# Patient Record
Sex: FEMALE | Race: BLACK OR AFRICAN AMERICAN | NOT HISPANIC OR LATINO | Employment: PART TIME | URBAN - METROPOLITAN AREA
[De-identification: names, ages, dates, MRNs, and addresses within clinical notes are randomized per-mention and may not be internally consistent; named-entity substitution may affect disease eponyms.]

---

## 2023-06-21 PROBLEM — E61.1 IRON DEFICIENCY: Status: ACTIVE | Noted: 2022-04-22

## 2023-06-21 PROBLEM — D50.9 IRON DEFICIENCY ANEMIA: Status: ACTIVE | Noted: 2022-04-22

## 2023-06-21 PROBLEM — O03.4 RETAINED PRODUCTS OF CONCEPTION AFTER MISCARRIAGE: Status: ACTIVE | Noted: 2023-02-12

## 2023-06-21 PROBLEM — M54.16 LUMBAR RADICULOPATHY: Status: ACTIVE | Noted: 2021-12-02

## 2023-08-11 ENCOUNTER — OFFICE VISIT (OUTPATIENT)
Dept: FAMILY MEDICINE CLINIC | Facility: CLINIC | Age: 33
End: 2023-08-11
Payer: COMMERCIAL

## 2023-08-11 VITALS
HEIGHT: 62 IN | WEIGHT: 178 LBS | BODY MASS INDEX: 32.76 KG/M2 | HEART RATE: 82 BPM | TEMPERATURE: 97 F | OXYGEN SATURATION: 97 % | DIASTOLIC BLOOD PRESSURE: 80 MMHG | SYSTOLIC BLOOD PRESSURE: 120 MMHG

## 2023-08-11 DIAGNOSIS — N92.0 MENORRHAGIA WITH REGULAR CYCLE: ICD-10-CM

## 2023-08-11 DIAGNOSIS — F43.21 GRIEF ASSOCIATED WITH LOSS OF FETUS: Primary | ICD-10-CM

## 2023-08-11 PROCEDURE — 99213 OFFICE O/P EST LOW 20 MIN: CPT | Performed by: PHYSICIAN ASSISTANT

## 2023-08-11 PROCEDURE — 1160F RVW MEDS BY RX/DR IN RCRD: CPT | Performed by: PHYSICIAN ASSISTANT

## 2023-08-11 PROCEDURE — 1159F MED LIST DOCD IN RCRD: CPT | Performed by: PHYSICIAN ASSISTANT

## 2023-08-11 RX ORDER — NAPROXEN 500 MG/1
500 TABLET ORAL 2 TIMES DAILY WITH MEALS
Qty: 30 TABLET | Refills: 1 | Status: SHIPPED | OUTPATIENT
Start: 2023-08-11

## 2023-08-11 NOTE — PROGRESS NOTES
"     Follow Up Office Visit      Date: 2023   Patient Name: Trace Aranda  : 1990   MRN: [id number]     Chief Complaint:  No chief complaint on file.      History of Present Illness: Trace Aranda is a 33 y.o. female who is here today to follow up with needing some forms filled out for work.  She had a miscarriage recently and has not been back to work.  She still needs to see gynecology.  She is having a lot of heavy bleeding with the most recent menses she had.  She denies any dizziness, no abdominal pain.  Would like some therapy for her grief.      Subjective      Review of systems:  Review of Systems     I have reviewed and the following portions of the patient's history were updated as appropriate: past family history, past medical history, past social history, past surgical history and problem list.    Medications:     Current Outpatient Medications:     acetaminophen (TYLENOL) 500 MG tablet, Take 2 tablets by mouth 4 (Four) Times a Day., Disp: , Rfl:     ibuprofen (ADVIL,MOTRIN) 800 MG tablet, Take 1 tablet by mouth Every 6 (Six) Hours As Needed for Mild Pain., Disp: , Rfl:     olopatadine (PATADAY) 0.2 % solution ophthalmic solution, Administer 1 drop to the right eye Daily., Disp: 2.5 mL, Rfl: 2    naproxen (Naprosyn) 500 MG tablet, Take 1 tablet by mouth 2 (Two) Times a Day With Meals. For pain, Disp: 30 tablet, Rfl: 1    Allergies:   No Known Allergies    Objective     Vital Signs:   Vitals:    23 1215   BP: 120/80   Pulse: 82   Temp: 97 øF (36.1 øC)   SpO2: 97%   Weight: 80.7 kg (178 lb)   Height: 157.5 cm (62.01\")   PainSc: 0-No pain     Body mass index is 32.55 kg/mý.          Physical Exam:   Physical Exam  Vitals and nursing note reviewed.   Constitutional:       Appearance: Normal appearance.   HENT:      Head: Normocephalic and atraumatic.   Cardiovascular:      Rate and Rhythm: Normal rate and regular rhythm.   Pulmonary:      Effort: Pulmonary effort is normal.      Breath sounds: " Normal breath sounds.   Musculoskeletal:      Cervical back: Neck supple.      Right lower leg: No edema.      Left lower leg: No edema.   Neurological:      Mental Status: She is alert.   Psychiatric:         Attention and Perception: Attention normal.         Mood and Affect: Mood is depressed.         Speech: Speech normal.         Behavior: Behavior normal.        Procedures     Assessment / Plan      Assessment/Plan:   Diagnoses and all orders for this visit:    1. Grief associated with loss of fetus (Primary)  -     Ambulatory Referral to Behavioral Health    2. Menorrhagia with regular cycle  -     Ambulatory Referral to Gynecology    Other orders  -     naproxen (Naprosyn) 500 MG tablet; Take 1 tablet by mouth 2 (Two) Times a Day With Meals. For pain  Dispense: 30 tablet; Refill: 1       We will arrange therapy for her.  Refer to gynecology.  We will also fill out her form she has today for work.  She will let me know of any issues.  Follow Up:   No follow-ups on file.    Judi Helm PA-C   [facility]

## 2023-09-14 ENCOUNTER — TELEPHONE (OUTPATIENT)
Dept: PSYCHIATRY | Facility: CLINIC | Age: 33
End: 2023-09-14
Payer: COMMERCIAL

## 2023-09-14 NOTE — TELEPHONE ENCOUNTER
Left patient a detailed vm in regards to the appointment being reschedule with [facility]

## 2023-09-20 ENCOUNTER — TELEMEDICINE (OUTPATIENT)
Dept: PSYCHIATRY | Facility: CLINIC | Age: 33
End: 2023-09-20
Payer: COMMERCIAL

## 2023-09-20 DIAGNOSIS — N92.0 MENORRHAGIA WITH REGULAR CYCLE: Primary | ICD-10-CM

## 2023-09-20 NOTE — PROGRESS NOTES
To: Judi Helm PA-C,    [facility], I met with Trace Aranda today for an Initial Evaluation regarding her mental health needs. During this first session, she expressed that she has been told by her health providers that she would be seeing a gynecologist for the problems related to heavy bleeding (including clots of blood) during her menstrual periods. These have occurred since her miscarriage on May 28, 2023.    Please help Trace, as soon as possible, to secure an appointment with a gynecologist. Trace suffers from serious thoughts of not caring whether she lives, however, she does not have a plan or say she wants to commit suicide, at this time.    I cannot emphasize enough the importance of setting a gynecologist appointment for Trace and calling her ASAP, best number is [phone], hopefully by tomorrow (9/21/2023) to confirm what date and time an appointment has been made for her.    Thank you for your urgent attention to this concern for Trace Aranda, your patient.     Sincerely,    Abena Vanessa, [facility]  [phone]
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
## 2023-10-04 ENCOUNTER — TELEMEDICINE (OUTPATIENT)
Dept: PSYCHIATRY | Facility: CLINIC | Age: 33
End: 2023-10-04
Payer: COMMERCIAL

## 2023-10-04 NOTE — PROGRESS NOTES
Date: October 10, 2023  Time In: 4:08 pm   Time Out: 4:38 pm     This provider is [facility] [address], [place] [address] using a secure Ex[employer]hart Video Visit through [employer]. Patient is being seen remotely via telehealth at home address in [place] and stated they are in a secure environment for this session. The patient's condition being diagnosed/treated is appropriate for telemedicine. The provider identified herself as well as her credentials. The patient, and/or patients guardian, consent to be seen remotely, and when consent is given they understand that the consent allows for patient identifiable information to be sent to a third party as needed. They may refuse to be seen remotely at any time. The electronic data is encrypted and password protected, and the patient and/or guardian has been advised of the potential risks to privacy not withstanding such measures.     You have chosen to receive care through a telehealth visit.  Do you consent to use a video/audio connection for your medical care today? Yes    PROGRESS NOTE  Data:  Trace Aranda is a 33 y.o. female who presents today for follow up    Chief Complaint: Pt. Said she feels great (good that we get to talk again.)    History of Present Illness: Depression, she needs to see a gynecologist because of the heavy bleeding that has now stopped.       Clinical Maneuvering/Intervention:    (Scales based on 0 - 10 with 10 being the worst)  Depression: 7 Anxiety: 8 or 9        Assisted patient in processing above session content; acknowledged and normalized patient's thoughts, feelings, and concerns.  Rationalized patient thought process regarding anxiety and depression.  Discussed triggers associated with patient's anxiety including her supervisor not having understanding of medical and mental health appointments.  Also discussed coping skills for patient to implement such as  taking care of herself and having activities that will make her feel better.    Allowed patient to freely discuss issues without interruption or judgment. Provided safe, confidential environment to facilitate the development of positive therapeutic relationship and encourage open, honest communication. Assisted patient in identifying risk factors which would indicate the need for higher level of care including thoughts to harm self or others and/or self-harming behavior and encouraged patient to contact this office, call 911, or present to the nearest emergency room should any of these events occur. Discussed crisis intervention services and means to access. Patient adamantly and convincingly denies current suicidal or homicidal ideation or perceptual disturbance.    Assessment:   Assessment   Patient appears to maintain relative stability as compared to their baseline.  However, patient continues to struggle with anxieety which continues to cause impairment in important areas of functioning.  A result, they can be reasonably expected to continue to benefit from treatment and would likely be at increased risk for decompensation otherwise. Pt reports that her friends help by making her laugh and keeping her occupied by talking and sharing information.     Mental Status Exam:   Hygiene:   good  Cooperation:  Cooperative  Eye Contact:  Good  Psychomotor Behavior:  Appropriate  Affect:  Appropriate  Mood: depressed and anxious  Speech:  Normal  Thought Process:  Goal directed  Thought Content:  Normal  Suicidal:  None  Homicidal:  None  Hallucinations:  None  Delusion:  None  Memory:  Intact  Orientation:  Person, Place, Time, and Situation  Reliability:  good  Insight:  Good  Judgement:  Good  Impulse Control:  Good  Physical/Medical Issues:  Yes Pain level is 10 out of 1 to 10 - she is suffering even while we are meeting for the session today. She has trouble sleeping on her back - lower pack  pain - she exhibits that  this hard to bare, having difficulty sitting or holding her head up due to the pain.         Patient's Support Network Includes:  mother  daily    Functional Status: Severe impairment because of the back pain     Progress toward goal: Not at goal    Prognosis: Guarded with Ongoing Treatment         Plan:    Patient will continue in individual outpatient therapy with focus on improved functioning and coping skills, maintaining stability, and avoiding decompensation and the need for higher level of care.    Patient will adhere to medication regimen as prescribed and report any side effects. Patient will contact this office, call 911 or present to the nearest emergency room should suicidal or homicidal ideations occur. Provide Cognitive Behavioral Therapy and Solution Focused Therapy to improve functioning, maintain stability, and avoid decompensation and the need for higher level of care.     Return in about 2 weeks, or earlier if symptoms worsen or fail to improve.           VISIT DIAGNOSIS:   No diagnosis found.       This document has been electronically signed by Abena Vanessa [facility]  October 10, 2023 16:48 EDT     Part of this note may be an electronic transcription/translation of spoken language to printed text using the Dragon Dictation System.

## 2023-11-06 ENCOUNTER — TELEPHONE (OUTPATIENT)
Dept: FAMILY MEDICINE CLINIC | Facility: CLINIC | Age: 33
End: 2023-11-06

## 2023-11-06 NOTE — TELEPHONE ENCOUNTER
Caller: Trace Aranda    Relationship to patient: Self    Best call back number: [phone]    New or established patient?  [] New  [x] Established    Date of discharge: 11/03/2023    Facility discharged from: [facility]    Diagnosis/Symptoms: HEADACHE

## 2023-11-07 ENCOUNTER — OFFICE VISIT (OUTPATIENT)
Dept: FAMILY MEDICINE CLINIC | Facility: CLINIC | Age: 33
End: 2023-11-07
Payer: COMMERCIAL

## 2023-11-07 VITALS
WEIGHT: 175 LBS | HEIGHT: 62 IN | SYSTOLIC BLOOD PRESSURE: 100 MMHG | OXYGEN SATURATION: 100 % | HEART RATE: 84 BPM | TEMPERATURE: 98.6 F | DIASTOLIC BLOOD PRESSURE: 64 MMHG | BODY MASS INDEX: 32.2 KG/M2

## 2023-11-07 DIAGNOSIS — R51.9 SUDDEN ONSET OF SEVERE HEADACHE: Primary | ICD-10-CM

## 2023-11-07 DIAGNOSIS — R93.0 ABNORMAL CT SCAN OF HEAD: ICD-10-CM

## 2023-11-07 RX ORDER — PREDNISONE 50 MG/1
50 TABLET ORAL DAILY
COMMUNITY
Start: 2023-11-03 | End: 2023-11-07

## 2023-11-07 RX ORDER — KETOROLAC TROMETHAMINE 10 MG/1
TABLET, FILM COATED ORAL
COMMUNITY
Start: 2023-10-22 | End: 2023-11-10

## 2023-11-07 RX ORDER — ONDANSETRON 8 MG/1
8 TABLET, ORALLY DISINTEGRATING ORAL
COMMUNITY
Start: 2023-10-18

## 2023-11-07 NOTE — PROGRESS NOTES
Follow Up Office Visit      Date: 2023   Patient Name: Trace Aranda  : 1990   MRN: [id number]     Chief Complaint:    Chief Complaint   Patient presents with    Follow-up     ED Follow up for Headaches they found something on the CT scan and she was told to follow up with you.       History of Present Illness: Trace Aranda is a 33 y.o. female who is here today to follow up with headaches.      Trace Aranda date of birth 1990. Presents in clinic for a emergency department follow-up.     The patient reports that she has had headaches for 1 month. She reports that she is unable to work she reprots that she has been taking Tylenol for reliefe. She reports that her headaches get so severe she does not want to talk. She reports that she had a CT scan of her head with contrast and lumbar puncture on her spinal cord. She reports that her CT scan and Lumbar puncture resulted normal. She reports that she was prescribed mediation and sent home. The patient reports that the more that she goes to the emergency room she becomes sick. She reprots that she was discharged from the emergency department on 11/3/2023. She reports that she was referred to her primary care physcician and diagnosed with a migraine. She reports that she has never had headaches like this previously. The patient reports that her head has been hurting all over. She reports that after her lumbar puncture she begun to have pain in her spinal cord that travels to her head. She reports that she experiences pain anytime she tries to move. The patient reports that she has had a MRI before when she broke her leg. She reports that she might have something wrong in her pituitary gland. The patient reports that her vision is well. She reports that while at the emergency department she was not alerted about her blood pressure. She reports that she has fevers sometimes. The patient denies any issues walking. She reports that she has dizziness  "occasionally.    Subjective      Review of systems:  Review of Systems   Constitutional:  Negative for fatigue and fever.   HENT:  Negative for trouble swallowing.    Eyes:  Negative for visual disturbance.   Respiratory:  Negative for cough and shortness of breath.    Cardiovascular:  Negative for chest pain and leg swelling.   Gastrointestinal:  Negative for abdominal pain.        I have reviewed and the following portions of the patient's history were updated as appropriate: past family history, past medical history, past social history, past surgical history and problem list.    Medications:     Current Outpatient Medications:     acetaminophen (TYLENOL) 500 MG tablet, Take 2 tablets by mouth 4 (Four) Times a Day., Disp: , Rfl:     naproxen (Naprosyn) 500 MG tablet, Take 1 tablet by mouth 2 (Two) Times a Day With Meals. For pain, Disp: 30 tablet, Rfl: 1    ondansetron ODT (ZOFRAN-ODT) 8 MG disintegrating tablet, Take 1 tablet by mouth., Disp: , Rfl:     Allergies:   No Known Allergies    Objective     Vital Signs:   Vitals:    11/07/23 1055   BP: 100/64   Pulse: 84   Temp: 98.6 °F (37 °C)   SpO2: 100%   Weight: 79.4 kg (175 lb)   Height: 157.5 cm (62\")     Body mass index is 32.01 kg/m².          Physical Exam:   Physical Exam  Vitals and nursing note reviewed.   Constitutional:       Appearance: Normal appearance.   HENT:      Head: Normocephalic and atraumatic.      Right Ear: Tympanic membrane and ear canal normal.      Left Ear: Tympanic membrane and ear canal normal.      Nose: No congestion or rhinorrhea.      Mouth/Throat:      Mouth: Mucous membranes are moist.      Pharynx: Oropharynx is clear. No posterior oropharyngeal erythema.   Eyes:      Extraocular Movements: Extraocular movements intact.      Pupils: Pupils are equal, round, and reactive to light.   Cardiovascular:      Rate and Rhythm: Normal rate and regular rhythm.   Pulmonary:      Effort: Pulmonary effort is normal.      Breath sounds: " Normal breath sounds. No decreased breath sounds, wheezing, rhonchi or rales.   Musculoskeletal:      Cervical back: Neck supple.      Right lower leg: No edema.      Left lower leg: No edema.   Lymphadenopathy:      Cervical: No cervical adenopathy.   Neurological:      General: No focal deficit present.      Mental Status: She is alert.      Cranial Nerves: Cranial nerves 2-12 are intact.      Motor: Motor function is intact.      Gait: Gait is intact.          Procedures     Assessment / Plan      Assessment/Plan:   Diagnoses and all orders for this visit:    1. Sudden onset of severe headache (Primary)  -     MRI Brain With & Without Contrast; Future    2. Abnormal CT scan of head  -     MRI Brain With & Without Contrast; Future       Plan, abdominal CT with a sudden onset severe head ache  We will obtain a MRI ASAP to further assess.     Follow Up:   No follow-ups on file.    Judi Helm PA-C   [facility] [place] [place] [facility] [place]  Transcribed from ambient dictation for Judi Helm PA-C by Gini Romero.  11/07/23   12:27 EST    Patient or patient representative verbalized consent to the visit recording.  I have personally performed the services described in this document as transcribed by the above individual, and it is both accurate and complete.

## 2023-11-08 ENCOUNTER — TELEPHONE (OUTPATIENT)
Dept: FAMILY MEDICINE CLINIC | Facility: CLINIC | Age: 33
End: 2023-11-08
Payer: COMMERCIAL

## 2023-11-08 ENCOUNTER — TELEMEDICINE (OUTPATIENT)
Dept: PSYCHIATRY | Facility: CLINIC | Age: 33
End: 2023-11-08
Payer: COMMERCIAL

## 2023-11-08 DIAGNOSIS — F33.2 SEVERE EPISODE OF RECURRENT MAJOR DEPRESSIVE DISORDER, WITHOUT PSYCHOTIC FEATURES: ICD-10-CM

## 2023-11-08 DIAGNOSIS — F41.1 GENERALIZED ANXIETY DISORDER: ICD-10-CM

## 2023-11-08 NOTE — PROGRESS NOTES
Date: November 8, 2023  Time In: 8:00 am EST   Time Out: 8:50 am EST    This provider is located at the [facility]through [facility]), [address], [place] [address] using a secure [employer] Video Visit through [employer]. Patient is being seen remotely via telehealth at home address in [place] and stated they are in a secure environment for this session. The patient's condition being diagnosed/treated is appropriate for telemedicine. The provider identified herself as well as her credentials. The patient, and/or patients guardian, consent to be seen remotely, and when consent is given they understand that the consent allows for patient identifiable information to be sent to a third party as needed. They may refuse to be seen remotely at any time. The electronic data is encrypted and password protected, and the patient and/or guardian has been advised of the potential risks to privacy not withstanding such measures.     You have chosen to receive care through a telehealth visit.  Do you consent to use a video/audio connection for your medical care today? Yes    Subjective   Trace Aranda is a 33 y.o. female who presents today for follow up    Chief Complaint:   Chief Complaint   Patient presents with    Sleeping Problem    Anxiety    Depression    Headache    Medication Problem    Pain    Med Management           Clinical Maneuvering/Intervention:      Assisted patient in processing above session content; acknowledged and normalized patient’s thoughts, feelings, and concerns.  Rationalized patient thought process regarding concerns presented at session.  Discussed triggers associated with patient's  anxiety , depression and extreme pain causing inability to sleep and rest after having a lumbar puncture. She reports that it hurts to talk - she feels this in her back and causes additional pain. Also discussed coping skills for patient to implement such as  Pt stating there is  little comfort and relief for her and she is in constant pain, Pt states she has been to ER five times over the past few weeks and not able to receive relief for any length of time -  Told her that steroids could help at hospital while she does not have pain medications to relieve her extreme/unbearable back pain and headaches.       Allowed patient to freely discuss issues without interruption or judgment. Provided safe, confidential environment to facilitate the development of positive therapeutic relationship and encourage open, honest communication. Assisted patient in identifying risk factors which would indicate the need for higher level of care including thoughts to harm self or others and/or self-harming behavior and encouraged patient to contact this office, call 911, or present to the nearest emergency room should any of these events occur. Discussed crisis intervention services and means to access. Patient adamantly and convincingly denies current suicidal or homicidal ideation or perceptual disturbance.    Assessment:     Patient appears to maintain relative stability as compared to their baseline.  However, patient continues to struggle with extreme pain that she describes as unbearable and causing headaches (anxiety and depression) which continues to cause impairment in important areas of functioning.  A result, they can be reasonably expected to continue to benefit from treatment and would likely be at increased risk for decompensation otherwise.      PHQ-Score Total:  PHQ-9 Total Score: 24    SELENE-7 Score Total:  Over the last two weeks, how often have you been bothered by the following problems?  Feeling nervous, anxious or on edge: Nearly every day  Not being able to stop or control worrying: Nearly every day  Worrying too much about different things: Nearly every day  Trouble Relaxing: Nearly every day  Being so restless that it is hard to sit still: Nearly every day  Becoming easily annoyed or  irritable: Nearly every day  Feeling afraid as if something awful might happen: Nearly every day  SELENE 7 Total Score: 21  If you checked any problems, how difficult have these problems made it for you to do your work, take care of things at home, or get along with other people: Extremely difficult      Mental Status Exam:   Hygiene:   poor  Cooperation:  Cooperative  Eye Contact:  Poor  Psychomotor Behavior:  Slow  Affect:   sad due to severe pain  Mood: depressed  Speech:  Monotone  Thought Process:  Linear  Thought Content:   struggles to answer questions/thinking appears to cause more pain  Suicidal:  None  Homicidal:  None  Hallucinations:  None  Delusion:  None  Memory:  Intact  Orientation:  Person, Place, Time, and Situation  Reliability:  good  Insight:  Good  Judgement:  Good  Impulse Control:  Good  Physical/Medical Issues:  Yes Severe pain including Pt reports that the back pain after the lumbar puncture increased and is unbearable/she is unable to sleep. Pt has severe headaches and back pain constantly. She reports having been to the ER five times over the past few weeks.         Patient's Support Network Includes:  extended family - her cousin transported her to the Dr. Will Yesterday. Pt cannot drive due to the severe pain.    Functional Status: Severe impairment    Progress toward goal: Not at goal    Prognosis:  Pt needs help from her medical team to manage her extreme pain.       Plan:    Patient will continue in individual outpatient therapy with focus on improved functioning and coping skills, maintaining stability, and avoiding decompensation and the need for higher level of care.    Patient will adhere to medication regimen as prescribed and report any side effects. Patient will contact this office, call 911 or present to the nearest emergency room should suicidal or homicidal ideations occur. Provide Cognitive Behavioral Therapy and Solution Focused Therapy to improve functioning, maintain  stability, and avoid decompensation and the need for higher level of care.         VISIT DIAGNOSIS:     ICD-10-CM ICD-9-CM   1. Generalized anxiety disorder  F41.1 300.02   2. Severe episode of recurrent major depressive disorder, without psychotic features  F33.2 296.33        Return in about 1 week (around 11/15/2023).    This document has been electronically signed by Abena Vanessa [facility]  November 8, 2023 09:02 EST     Part of this note may be an electronic transcription/translation of spoken language to printed text using the Dragon Dictation System.

## 2023-11-08 NOTE — LETTER
November 8, 2023     Judi Helm PA-C  [facility] [address]    Patient: Trace Aranda   YOB: 1990   Date of Visit: 11/8/2023     Dear Judi Helm PA-C:       Thank you for referring Trace Aranda to me for evaluation. Below are the relevant portions of my assessment and plan of care.    I met with Trace today over the Telehealth services for her mental health therapy. She expressed the severity of her pain in her head and back, stating that after the lumbar puncture - this accelerated. She shared that she met with you yesterday. Her pain was evident and she was hurting so much that talking even added to her pain levels.     Trace expressed concerns and needs for financial assistance with her medical bills. Please help by referring Trace to the proper financial counselors at [facility] to assist with payment of her doctor bills, medical tests, etc.  This assistance should help relieve one of the additional stressors felt by this patient. She stated that she has been unable to work for 4 weeks and this is causing concerns of how she will pay her bills.  Anything you are able to do, including referring her to the proper official with [facility] will be appreciated.     If you have questions, please do not hesitate to call me. I look forward to following Trace along with you.         Sincerely,      Abena Vanessa, [facility]    CC: No Recipients

## 2023-11-09 ENCOUNTER — TELEPHONE (OUTPATIENT)
Dept: FAMILY MEDICINE CLINIC | Facility: CLINIC | Age: 33
End: 2023-11-09
Payer: COMMERCIAL

## 2023-11-09 NOTE — TELEPHONE ENCOUNTER
PATIENT SAID EMPLOYER IS SENDING PAPERWORK.  PLEASE CONTACT PATIENT WHEN FILLING OUT. I BELIEVE THIS IS [facility] PAPERWORK AND SHE SAID IT IS DUE TOMORROW.  I DID INFORM PATIENT THERE IS A $20 FORM FEE THAT WILL NEED TO BE PAID BEFORE THE PAPERWORK CAN BE RELEASED.      PHONE: [phone]

## 2023-11-10 ENCOUNTER — OFFICE VISIT (OUTPATIENT)
Dept: FAMILY MEDICINE CLINIC | Facility: CLINIC | Age: 33
End: 2023-11-10
Payer: COMMERCIAL

## 2023-11-10 VITALS
OXYGEN SATURATION: 98 % | HEART RATE: 72 BPM | TEMPERATURE: 97.6 F | HEIGHT: 62 IN | BODY MASS INDEX: 32.46 KG/M2 | DIASTOLIC BLOOD PRESSURE: 94 MMHG | SYSTOLIC BLOOD PRESSURE: 126 MMHG | WEIGHT: 176.4 LBS

## 2023-11-10 DIAGNOSIS — Z23 NEED FOR IMMUNIZATION AGAINST INFLUENZA: ICD-10-CM

## 2023-11-10 DIAGNOSIS — G43.511 INTRACTABLE PERSISTENT MIGRAINE AURA WITHOUT CEREBRAL INFARCTION AND WITH STATUS MIGRAINOSUS: Primary | ICD-10-CM

## 2023-11-10 NOTE — TELEPHONE ENCOUNTER
PATIENT CALLED ANS STATED THEIR IS PAGE OF THE PAPER WORK THAT NEEDS TO BE CHANGED. PART C AND C1. PART C NEED TO BE CHANGE TO UNINTERRUPTED BLOCK OF TIME OFF.   C1 NEEDS TO ADD BEGINNING DATES AND RETURN TO WORK DATES. BEGINNING DATE IS 10/16/2023-11/10/2023  C2 CAN BE LEFT BLANK.  PLEASE CALL IF YOU HAVE QUESTIONS

## 2023-11-10 NOTE — PROGRESS NOTES
"     Follow Up Office Visit      Date: 11/10/2023   Patient Name: Trace Aranda  : 1990   MRN: [id number]     Chief Complaint:    Chief Complaint   Patient presents with    paperwork       History of Present Illness: Trace Aranda is a 33 y.o. female who is here today to follow up with [place] paperwork. She is currently undergoing workup for new onset severe headaches. She is having trouble working with these, and also getting to appts.  Flu vaccine today.      Subjective      Review of systems:  Review of Systems     I have reviewed and the following portions of the patient's history were updated as appropriate: past family history, past medical history, past social history, past surgical history and problem list.    Medications:     Current Outpatient Medications:     acetaminophen (TYLENOL) 500 MG tablet, Take 2 tablets by mouth 4 (Four) Times a Day., Disp: , Rfl:     naproxen (Naprosyn) 500 MG tablet, Take 1 tablet by mouth 2 (Two) Times a Day With Meals. For pain, Disp: 30 tablet, Rfl: 1    ondansetron ODT (ZOFRAN-ODT) 8 MG disintegrating tablet, Take 1 tablet by mouth., Disp: , Rfl:     Allergies:   No Known Allergies    Objective     Vital Signs:   Vitals:    11/10/23 1038   BP: 126/94   Pulse: 72   Temp: 97.6 °F (36.4 °C)   TempSrc: Infrared   SpO2: 98%   Weight: 80 kg (176 lb 6.4 oz)   Height: 157.5 cm (62.01\")   PainSc:   5     Body mass index is 32.26 kg/m².          Physical Exam:   Physical Exam   I fill out her [facility] papers today. No exam repeated.    Procedures     Assessment / Plan      Assessment/Plan:   Diagnoses and all orders for this visit:    1. Intractable persistent migraine aura without cerebral infarction and with status migrainosus (Primary)    2. Need for immunization against influenza  -     FluLaval/Fluzone >6 mos  ([id number])         Follow Up:   No follow-ups on file.    Judi Helm PA-C   [facility] [place] [facility] [place]  "

## 2023-11-13 ENCOUNTER — TELEPHONE (OUTPATIENT)
Dept: FAMILY MEDICINE CLINIC | Facility: CLINIC | Age: 33
End: 2023-11-13

## 2023-11-13 NOTE — TELEPHONE ENCOUNTER
Caller: Trace Aranda    Relationship: Self    Best call back number: [phone]    What form or medical record are you requesting: [facility] PAPERWORK    Who is requesting this form or medical record from you: WORK    How would you like to receive the form or medical records (pick-up, mail, fax): FAX TO [employer]      Timeframe paperwork needed: ASAP    Additional notes: PATIENT STATES HER WORK HAS NOT RECEIVED ALL PAPERWORK ON HER [facility]. PLEASE RESEND ALL PAPERS.

## 2023-11-13 NOTE — TELEPHONE ENCOUNTER
Yes it was faxed at the front - I don't see where it has been scanned into chart yet. I need to make a change on it so I do need those back.

## 2023-11-14 ENCOUNTER — TELEMEDICINE (OUTPATIENT)
Dept: PSYCHIATRY | Facility: CLINIC | Age: 33
End: 2023-11-14
Payer: COMMERCIAL

## 2023-11-14 DIAGNOSIS — F33.1 MAJOR DEPRESSIVE DISORDER, RECURRENT EPISODE, MODERATE: Primary | ICD-10-CM

## 2023-11-14 PROCEDURE — 90832 PSYTX W PT 30 MINUTES: CPT | Performed by: COUNSELOR

## 2023-11-14 NOTE — PROGRESS NOTES
Date: November 14, 2023  Time In: 4:00 pm EST  Time Out: 4:16 pm EST    This provider is located at the [facility] ([facility]), [address], [place] [address] using a secure [employer] Video Visit through [employer]. Patient is being seen remotely via telehealth at home address in [place] and stated they are in a secure environment for this session. The patient's condition being diagnosed/treated is appropriate for telemedicine. The provider identified herself as well as her credentials. The patient, and/or patients guardian, consent to be seen remotely, and when consent is given they understand that the consent allows for patient identifiable information to be sent to a third party as needed. They may refuse to be seen remotely at any time. The electronic data is encrypted and password protected, and the patient and/or guardian has been advised of the potential risks to privacy not withstanding such measures.     You have chosen to receive care through a telehealth visit.  Do you consent to use a video/audio connection for your medical care today? Yes    Subjective   Trace Aranda is a 33 y.o. female who presents today for follow up    Chief Complaint:   Chief Complaint   Patient presents with    Depression           Clinical Maneuvering/Intervention:      Assisted patient in processing above session content; acknowledged and normalized patient’s thoughts, feelings, and concerns.  Rationalized patient thought process regarding concerns presented at session.  Discussed triggers associated with patient's  depression  Also discussed coping skills for patient to implement such as increasing activity , self care , positive self talk , and [employer] visiting.      Allowed patient to freely discuss issues without interruption or judgment. Provided safe, confidential environment to facilitate the development of positive therapeutic relationship and encourage open,  honest communication. Assisted patient in identifying risk factors which would indicate the need for higher level of care including thoughts to harm self or others and/or self-harming behavior and encouraged patient to contact this office, call 911, or present to the nearest emergency room should any of these events occur. Discussed crisis intervention services and means to access. Patient adamantly and convincingly denies current suicidal or homicidal ideation or perceptual disturbance.    Assessment:     Patient appears to maintain relative stability as compared to their baseline.  However, patient continues to struggle with depression which continues to cause impairment in important areas of functioning.  A result, they can be reasonably expected to continue to benefit from treatment and would likely be at increased risk for decompensation otherwise.      PHQ-Score Total:  PHQ-9 Total Score: 16    SELENE-7 Score Total:         Mental Status Exam:   Hygiene:   good  Cooperation:  Cooperative  Eye Contact:  Good  Psychomotor Behavior:  Appropriate  Affect:  Appropriate  Mood: normal  Speech:  Normal  Thought Process:  Goal directed  Thought Content:  Normal  Suicidal:  None  Homicidal:  None  Hallucinations:  None  Delusion:  None  Memory:  Intact  Orientation:  Person, Place, Time, and Situation  Reliability:  good  Insight:  Good  Judgement:  Good  Impulse Control:  Good  Physical/Medical Issues:  Yes headaches, back pain         Patient's Support Network Includes:  extended family and [employer] members    Functional Status: Moderate impairment     Progress toward goal: Not at goal    Prognosis: Guarded with Ongoing Treatment      Plan:    Patient will continue in individual outpatient therapy with focus on improved functioning and coping skills, maintaining stability, and avoiding decompensation and the need for higher level of care.    Patient will adhere to medication regimen as prescribed and report any side  effects. Patient will contact this office, call 911 or present to the nearest emergency room should suicidal or homicidal ideations occur. Provide Cognitive Behavioral Therapy and Solution Focused Therapy to improve functioning, maintain stability, and avoid decompensation and the need for higher level of care.         VISIT DIAGNOSIS:     ICD-10-CM ICD-9-CM   1. Major depressive disorder, recurrent episode, moderate  F33.1 296.32        No follow-ups on file.    This document has been electronically signed by Abena Vanessa [facility] Hospital[facility]SE ANGEL  November 14, 2023 4:00 pm EST     Part of this note may be an electronic transcription/translation of spoken language to printed text using the Dragon Dictation System.

## 2023-11-20 NOTE — TELEPHONE ENCOUNTER
I have filled in the requested information for the block of time. I would recommend keeping the intermittent leave as well in case she needs to miss work due to appts, tests, or migraines, as she can use FMLA for those things associated with her migraines.

## 2023-11-21 ENCOUNTER — TELEPHONE (OUTPATIENT)
Dept: FAMILY MEDICINE CLINIC | Facility: CLINIC | Age: 33
End: 2023-11-21

## 2023-11-21 ENCOUNTER — HOSPITAL ENCOUNTER (OUTPATIENT)
Dept: MRI IMAGING | Facility: HOSPITAL | Age: 33
Discharge: HOME | End: 2023-11-21
Admitting: PHYSICIAN ASSISTANT
Payer: COMMERCIAL

## 2023-11-21 DIAGNOSIS — R51.9 SUDDEN ONSET OF SEVERE HEADACHE: ICD-10-CM

## 2023-11-21 DIAGNOSIS — R93.0 ABNORMAL CT SCAN OF HEAD: ICD-10-CM

## 2023-11-21 PROCEDURE — A9577 INJ MULTIHANCE: HCPCS | Performed by: PHYSICIAN ASSISTANT

## 2023-11-21 PROCEDURE — 70553 MRI BRAIN STEM W/O & W/DYE: CPT

## 2023-11-21 PROCEDURE — 0 GADOBENATE DIMEGLUMINE 529 MG/ML SOLUTION: Performed by: PHYSICIAN ASSISTANT

## 2023-11-21 RX ADMIN — GADOBENATE DIMEGLUMINE 15 ML: 529 INJECTION, SOLUTION INTRAVENOUS at 15:54

## 2023-11-27 ENCOUNTER — TELEMEDICINE (OUTPATIENT)
Dept: PSYCHIATRY | Facility: CLINIC | Age: 33
End: 2023-11-27
Payer: COMMERCIAL

## 2023-11-27 DIAGNOSIS — F41.1 GENERALIZED ANXIETY DISORDER: ICD-10-CM

## 2023-11-27 DIAGNOSIS — F33.2 SEVERE EPISODE OF RECURRENT MAJOR DEPRESSIVE DISORDER, WITHOUT PSYCHOTIC FEATURES: ICD-10-CM

## 2023-11-27 NOTE — LETTER
November 27, 2023     Judi Helm PA-C  [address] [place] [id number]    Patient: Trace Aranda   YOB: 1990   Date of Visit: 11/27/2023     Dear Judi Helm PA-C:       Thank you for referring Trace Aranda to me for evaluation. Below are the relevant portions of my assessment and plan of care.    Today Trace met for therapy and mentioned that she doesn't understand the MyChart results about the recent MRI on 11/21/23. Please contact her to review this information with you because she doesn't understand what is documented on MyChart. We appreciate this very much.    If you have questions, please do not hesitate to call me. I look forward to following Trace along with you.         Sincerely,      Abena Vanessa, [facility]        CC: Trace Aranda

## 2023-11-27 NOTE — PROGRESS NOTES
Date: November 30, 2023  Time In: 16:00 EST  Time Out: 16:28 EST    This provider is located at the [facility] (through [facility]), [address], [place] [address] using a secure [employer] Video Visit through [employer]. Patient is being seen remotely via telehealth at home address in [place] and stated they are in a secure environment for this session. The patient's condition being diagnosed/treated is appropriate for telemedicine. The provider identified herself as well as her credentials. The patient, and/or patients guardian, consent to be seen remotely, and when consent is given they understand that the consent allows for patient identifiable information to be sent to a third party as needed. They may refuse to be seen remotely at any time. The electronic data is encrypted and password protected, and the patient and/or guardian has been advised of the potential risks to privacy not withstanding such measures.     You have chosen to receive care through a telehealth visit.  Do you consent to use a video/audio connection for your medical care today? Yes    Subjective   Trace Aranda is a 33 y.o. female who presents today for follow up    Chief Complaint:   Chief Complaint   Patient presents with    Anxiety    Depression           Clinical Maneuvering/Intervention:      Assisted patient in processing above session content; acknowledged and normalized patient’s thoughts, feelings, and concerns.  Rationalized patient thought process regarding concerns presented at session.  Discussed triggers associated with patient's  anxiety  and depression  Also discussed coping skills for patient to implement such as increasing activity , self care , positive self talk , and a friend to talk to sometimes.      Allowed patient to freely discuss issues without interruption or judgment. Provided safe, confidential environment to facilitate the development of positive therapeutic  relationship and encourage open, honest communication. Assisted patient in identifying risk factors which would indicate the need for higher level of care including thoughts to harm self or others and/or self-harming behavior and encouraged patient to contact this office, call 911, or present to the nearest emergency room should any of these events occur. Discussed crisis intervention services and means to access. Patient adamantly and convincingly denies current suicidal or homicidal ideation or perceptual disturbance.    Assessment:     Patient appears to maintain relative stability as compared to their baseline.  However, patient continues to struggle with anxiety and depression which continues to cause impairment in important areas of functioning.  A result, they can be reasonably expected to continue to benefit from treatment and would likely be at increased risk for decompensation otherwise.      PHQ-Score Total:  PHQ-9 Total Score: 15    SELENE-7 Score Total:  Over the last two weeks, how often have you been bothered by the following problems?  Feeling nervous, anxious or on edge: Several days  Not being able to stop or control worrying: More than half the days  Worrying too much about different things: More than half the days  Trouble Relaxing: More than half the days  Being so restless that it is hard to sit still: More than half the days  Becoming easily annoyed or irritable: Several days  Feeling afraid as if something awful might happen: More than half the days  SELENE 7 Total Score: 12  If you checked any problems, how difficult have these problems made it for you to do your work, take care of things at home, or get along with other people: Very difficult      Mental Status Exam:   Hygiene:   good  Cooperation:  Cooperative  Eye Contact:  Good  Psychomotor Behavior:  Appropriate  Affect:  Full range  Mood: normal  Speech:  Normal  Thought Process:  Goal directed  Thought Content:  Normal  Suicidal:   None  Homicidal:  None  Hallucinations:  None  Delusion:  None  Memory:  Intact  Orientation:  Person, Place, Time, and Situation  Reliability:  good  Insight:  Good  Judgement:  Good  Impulse Control:  Good  Physical/Medical Issues:  Yes Pt had an MRI of her head and is awaiting a call from physician to review the results.  Also, Pt. Needs an appointment with a gynecologist asap.      Patient's Support Network Includes:   Cousin    Functional Status: Severe impairment    Progress toward goal: Not at goal    Prognosis: Guarded with Ongoing Treatment; Pt is unable to work currently due to the severity of her headaches.       Plan:    Patient will continue in individual outpatient therapy with focus on improved functioning and coping skills, maintaining stability, and avoiding decompensation and the need for higher level of care.    Patient will adhere to medication regimen as prescribed and report any side effects. Patient will contact this office, call 911 or present to the nearest emergency room should suicidal or homicidal ideations occur. Provide Cognitive Behavioral Therapy and Solution Focused Therapy to improve functioning, maintain stability, and avoid decompensation and the need for higher level of care.         VISIT DIAGNOSIS:     ICD-10-CM ICD-9-CM   1. Generalized anxiety disorder  F41.1 300.02   2. Severe episode of recurrent major depressive disorder, without psychotic features  F33.2 296.33        Return in about 2 weeks (around 12/11/2023).    This document has been electronically signed by Abena Vanessa [facility]  November 30, 2023 14:33 EST     Part of this note may be an electronic transcription/translation of spoken language to printed text using the Dragon Dictation System.

## 2023-12-01 DIAGNOSIS — E23.7 PITUITARY LESION: Primary | ICD-10-CM

## 2023-12-05 ENCOUNTER — LAB (OUTPATIENT)
Dept: LAB | Facility: HOSPITAL | Age: 33
End: 2023-12-05
Payer: COMMERCIAL

## 2023-12-05 ENCOUNTER — OFFICE VISIT (OUTPATIENT)
Dept: NEUROSURGERY | Facility: CLINIC | Age: 33
End: 2023-12-05
Payer: COMMERCIAL

## 2023-12-05 VITALS — TEMPERATURE: 98 F | HEIGHT: 62 IN | WEIGHT: 171 LBS | BODY MASS INDEX: 31.47 KG/M2

## 2023-12-05 DIAGNOSIS — E23.7 PITUITARY LESION: Primary | ICD-10-CM

## 2023-12-05 DIAGNOSIS — E23.7 PITUITARY LESION: ICD-10-CM

## 2023-12-05 LAB
CORTIS SERPL-MCNC: 9.14 MCG/DL
PROLACTIN SERPL-MCNC: 72.6 NG/ML (ref 4.79–23.3)
T-UPTAKE NFR SERPL: 1.12 TBI (ref 0.8–1.3)
T4 SERPL-MCNC: 7.33 MCG/DL (ref 4.5–11.7)
TSH SERPL DL<=0.05 MIU/L-ACNC: 3.82 UIU/ML (ref 0.27–4.2)

## 2023-12-05 PROCEDURE — 83003 ASSAY GROWTH HORMONE (HGH): CPT

## 2023-12-05 PROCEDURE — 82024 ASSAY OF ACTH: CPT

## 2023-12-05 PROCEDURE — 82533 TOTAL CORTISOL: CPT

## 2023-12-05 PROCEDURE — 84305 ASSAY OF SOMATOMEDIN: CPT

## 2023-12-05 PROCEDURE — 84443 ASSAY THYROID STIM HORMONE: CPT

## 2023-12-05 PROCEDURE — 84436 ASSAY OF TOTAL THYROXINE: CPT

## 2023-12-05 PROCEDURE — 84479 ASSAY OF THYROID (T3 OR T4): CPT

## 2023-12-05 PROCEDURE — 84146 ASSAY OF PROLACTIN: CPT

## 2023-12-05 PROCEDURE — 36415 COLL VENOUS BLD VENIPUNCTURE: CPT

## 2023-12-05 NOTE — PROGRESS NOTES
Patient: Trace Aranda  : 1990    Primary Care Provider: Judi Helm PA-C    Requesting Provider: As above      Chief Complaint: Headache      History of Present Illness: This is a 33 y.o. female who presents for evaluation of a small pituitary lesion.  The patient underwent a brain MRI for headaches.  She states in October she began having severe headaches.  She denies any initiating event, but states she has been having daily headaches since that time.  She denies any changes in her vision, specifically denies any decrease in peripheral vision.  Additionally, she denies any hormonal abnormalities including weight changes or changes in her menstruation.    PMHX  Allergies:  No Known Allergies  Medications    Current Outpatient Medications:     acetaminophen (TYLENOL) 500 MG tablet, Take 2 tablets by mouth 4 (Four) Times a Day., Disp: , Rfl:     naproxen (Naprosyn) 500 MG tablet, Take 1 tablet by mouth 2 (Two) Times a Day With Meals. For pain, Disp: 30 tablet, Rfl: 1  Past Medical History:  Past Medical History:   Diagnosis Date    No pertinent past medical history      Past Surgical History:  Past Surgical History:   Procedure Laterality Date    APPENDECTOMY  2007    APPENDECTOMY       Social Hx:  Social History     Tobacco Use    Smoking status: Never    Smokeless tobacco: Never   Vaping Use    Vaping Use: Never used   Substance Use Topics    Alcohol use: Not Currently     Alcohol/week: 1.0 standard drink of alcohol     Types: 1 Glasses of wine per week    Drug use: Never     Family Hx:  Family History   Problem Relation Age of Onset    No Known Problems Mother      Review of Systems:        Review of Systems   Constitutional:  Negative for activity change, appetite change, chills, diaphoresis, fatigue, fever and unexpected weight change.   HENT:  Negative for congestion, dental problem, drooling, ear discharge, ear pain, facial swelling, hearing loss, mouth sores, nosebleeds, postnasal drip,  "rhinorrhea, sinus pressure, sinus pain, sneezing, sore throat, tinnitus, trouble swallowing and voice change.    Eyes:  Negative for photophobia, pain, discharge, redness, itching and visual disturbance.   Respiratory:  Negative for apnea, cough, choking, chest tightness, shortness of breath, wheezing and stridor.    Cardiovascular:  Negative for chest pain, palpitations and leg swelling.   Gastrointestinal:  Negative for abdominal distention, abdominal pain, anal bleeding, blood in stool, constipation, diarrhea, nausea, rectal pain and vomiting.   Endocrine: Negative for cold intolerance, heat intolerance, polydipsia, polyphagia and polyuria.   Genitourinary:  Negative for decreased urine volume, difficulty urinating, dysuria, enuresis, flank pain, frequency, genital sores, hematuria and urgency.   Musculoskeletal:  Negative for arthralgias, back pain, gait problem, joint swelling, myalgias, neck pain and neck stiffness.   Skin:  Negative for color change, pallor, rash and wound.   Allergic/Immunologic: Negative for environmental allergies, food allergies and immunocompromised state.   Neurological:  Positive for dizziness, speech difficulty, weakness and headaches. Negative for tremors, seizures, syncope, facial asymmetry, light-headedness and numbness.   Hematological:  Negative for adenopathy. Does not bruise/bleed easily.   Psychiatric/Behavioral:  Positive for sleep disturbance. Negative for agitation, behavioral problems, confusion, decreased concentration, dysphoric mood, hallucinations, self-injury and suicidal ideas. The patient is not nervous/anxious and is not hyperactive.    All other systems reviewed and are negative.       Physical Exam:   Temp 98 °F (36.7 °C) (Infrared)   Ht 157.5 cm (62\")   Wt 77.6 kg (171 lb)   LMP 11/05/2023   BMI 31.28 kg/m²   Awake, alert and oriented x 3  Speech f/c  Opens eyes spont  Pupils 3 mm rx bilaterally  Extraocular muscles intact bilaterally  Normal sensation to " light touch in all 3 distributions of CN V bilaterally  Face symmetric bilaterally  Tongue midline  5/5 in all 4 ext  No PD    Diagnostic Studies:  All neurological imaging studies were independently reviewed unless stated otherwise    Assessment/Plan:  This is a 33 y.o. female presenting for evaluation of a small pituitary lesion.  In reviewing the patient's brain MRI, there is a small cyst/lesion of the pituitary gland towards the right side.  There is no significant mass effect associated with this.  There is no compression of the optic chiasm.  In talking to the patient, she denies any obvious hormonal symptoms that could be attributed to a microadenoma.  For proper evaluation, we are going to obtain pituitary labs.  I plan to call the patient back after I reviewed the results.  Assuming that they are normal, I would continue to monitor this lesion with an MRI at 6 months.  I will discuss further plans with the patient after her pituitary labs.    Diagnoses and all orders for this visit:    1. Pituitary lesion (Primary)        Antony Pizano MD  12/05/23  15:49 EST

## 2023-12-07 ENCOUNTER — DOCUMENTATION (OUTPATIENT)
Dept: NEUROSURGERY | Facility: CLINIC | Age: 33
End: 2023-12-07
Payer: COMMERCIAL

## 2023-12-07 LAB — ACTH PLAS-MCNC: 10.5 PG/ML (ref 7.2–63.3)

## 2023-12-07 NOTE — PROGRESS NOTES
I spoke to the patient over the phone regarding the results of her hormone labs.  She has a mildly elevated prolactin at 72, but I explained to her that this is not high enough to be coming from a prolactinoma.  Clinically, she does not have any symptoms of elevated prolactin.  Her biggest complaint is headaches and I explained to her that I do not think this is coming from the small pituitary lesion.  There is no role for surgical intervention at this time.  We will plan to have the patient follow-up with me in 6 months with a new pituitary MRI.

## 2023-12-08 LAB
GH SERPL-MCNC: 0.1 NG/ML (ref 0–10)
IGF-I SERPL-MCNC: 183 NG/ML (ref 84–281)

## 2023-12-11 DIAGNOSIS — E23.7 PITUITARY LESION: Primary | ICD-10-CM

## 2023-12-13 ENCOUNTER — LAB (OUTPATIENT)
Dept: LAB | Facility: HOSPITAL | Age: 33
End: 2023-12-13
Payer: COMMERCIAL

## 2023-12-13 ENCOUNTER — OFFICE VISIT (OUTPATIENT)
Dept: FAMILY MEDICINE CLINIC | Facility: CLINIC | Age: 33
End: 2023-12-13
Payer: COMMERCIAL

## 2023-12-13 VITALS
OXYGEN SATURATION: 98 % | DIASTOLIC BLOOD PRESSURE: 74 MMHG | WEIGHT: 175 LBS | TEMPERATURE: 99.9 F | HEIGHT: 62 IN | HEART RATE: 98 BPM | SYSTOLIC BLOOD PRESSURE: 104 MMHG | BODY MASS INDEX: 32.2 KG/M2

## 2023-12-13 DIAGNOSIS — N91.2 AMENORRHEA: ICD-10-CM

## 2023-12-13 DIAGNOSIS — R51.9 SUDDEN ONSET OF SEVERE HEADACHE: ICD-10-CM

## 2023-12-13 DIAGNOSIS — E23.7 PITUITARY LESION: Primary | ICD-10-CM

## 2023-12-13 PROCEDURE — 1159F MED LIST DOCD IN RCRD: CPT | Performed by: PHYSICIAN ASSISTANT

## 2023-12-13 PROCEDURE — 99214 OFFICE O/P EST MOD 30 MIN: CPT | Performed by: PHYSICIAN ASSISTANT

## 2023-12-13 PROCEDURE — 36415 COLL VENOUS BLD VENIPUNCTURE: CPT

## 2023-12-13 PROCEDURE — 84702 CHORIONIC GONADOTROPIN TEST: CPT

## 2023-12-13 PROCEDURE — 1160F RVW MEDS BY RX/DR IN RCRD: CPT | Performed by: PHYSICIAN ASSISTANT

## 2023-12-13 NOTE — PROGRESS NOTES
"     Follow Up Office Visit      Date: 2023   Patient Name: Trace Aranda  : 1990   MRN: [id number]     Chief Complaint:    Chief Complaint   Patient presents with    Results     MRI    paperwork     Fixed the [facility] paper        History of Present Illness: Trace Aranda is a 33 y.o. female who is here today to follow up.    Her neurologist did a bunch of labs on her and informed her that everything was okay, but her prolactin was elevated, and he could not do anything about it and told her to follow up in 6 months to do another MRI. She is still having severe headaches and the same symptoms. He informed her to see endocrinology and refer her to another specialist.    She tested positive for pregnancy last week. She was bleeding a lot last week. She is having the same effect that she was having last time in her back and waist.    Subjective      Review of systems:  Review of Systems   Constitutional:  Negative for fatigue and fever.   HENT:  Negative for trouble swallowing.    Eyes:  Negative for visual disturbance.   Respiratory:  Negative for cough and shortness of breath.    Cardiovascular:  Negative for chest pain and leg swelling.   Gastrointestinal:  Negative for abdominal pain.        I have reviewed and the following portions of the patient's history were updated as appropriate: past family history, past medical history, past social history, past surgical history and problem list.    Medications:     Current Outpatient Medications:     acetaminophen (TYLENOL) 500 MG tablet, Take 2 tablets by mouth 4 (Four) Times a Day., Disp: , Rfl:     naproxen (Naprosyn) 500 MG tablet, Take 1 tablet by mouth 2 (Two) Times a Day With Meals. For pain, Disp: 30 tablet, Rfl: 1    Allergies:   No Known Allergies    Objective     Vital Signs:   Vitals:    23 1442   BP: 104/74   Pulse: 98   Temp: 99.9 °F (37.7 °C)   TempSrc: Infrared   SpO2: 98%   Weight: 79.4 kg (175 lb)   Height: 157.5 cm (62.01\")   PainSc:   5 "     Body mass index is 32 kg/m².          Physical Exam:   Physical Exam  Vitals and nursing note reviewed.   Constitutional:       Appearance: Normal appearance.   HENT:      Head: Normocephalic and atraumatic.      Right Ear: Tympanic membrane and ear canal normal.      Left Ear: Tympanic membrane and ear canal normal.      Nose: No congestion or rhinorrhea.      Mouth/Throat:      Mouth: Mucous membranes are moist.      Pharynx: Oropharynx is clear. No posterior oropharyngeal erythema.   Cardiovascular:      Rate and Rhythm: Normal rate and regular rhythm.   Pulmonary:      Effort: Pulmonary effort is normal.      Breath sounds: Normal breath sounds. No decreased breath sounds, wheezing, rhonchi or rales.   Musculoskeletal:      Cervical back: Neck supple.      Right lower leg: No edema.      Left lower leg: No edema.   Lymphadenopathy:      Cervical: No cervical adenopathy.   Neurological:      Mental Status: She is alert.          Procedures     Assessment / Plan      Assessment/Plan:   Diagnoses and all orders for this visit:    1. Pituitary lesion (Primary)  -     Ambulatory Referral to Endocrinology    2. Sudden onset of severe headache  -     Ambulatory Referral to Neurology    3. Amenorrhea  -     hCG, Quantitative, Pregnancy; Future    1. Pituitary lesion and severe headaches.  Neurosurgery is saying they will recheck this MRI in 6 months. Her prolactin level was quite elevated, so we will see what endocrinology says about this. I filled out forms for her today. This is for her work. I want to refer her to endocrinology and neurology for continued follow-up on the pituitary lesion as well as severe headaches. I will also check pregnancy test today as she states she had a positive one at home but has been having vaginal bleeding like a period last week.     Follow Up:   No follow-ups on file.    Judi Helm PA-C   [facility] [place]    Transcribed from ambient dictation for Judi Helm  ERI by Lakeshia Han.  12/13/23   16:03 EST    Patient or patient representative verbalized consent to the visit recording.  I have personally performed the services described in this document as transcribed by the above individual, and it is both accurate and complete.

## 2023-12-14 LAB — HCG INTACT+B SERPL-ACNC: NORMAL MIU/ML

## 2023-12-19 ENCOUNTER — OFFICE VISIT (OUTPATIENT)
Dept: NEUROLOGY | Facility: CLINIC | Age: 33
End: 2023-12-19
Payer: COMMERCIAL

## 2023-12-19 VITALS
SYSTOLIC BLOOD PRESSURE: 126 MMHG | HEIGHT: 62 IN | OXYGEN SATURATION: 99 % | BODY MASS INDEX: 32.21 KG/M2 | WEIGHT: 175.04 LBS | DIASTOLIC BLOOD PRESSURE: 92 MMHG | HEART RATE: 88 BPM

## 2023-12-19 DIAGNOSIS — Z3A.01 LESS THAN 8 WEEKS GESTATION OF PREGNANCY: ICD-10-CM

## 2023-12-19 DIAGNOSIS — E23.7 PITUITARY LESION: ICD-10-CM

## 2023-12-19 DIAGNOSIS — R51.9 NEW ONSET OF HEADACHES: Primary | ICD-10-CM

## 2023-12-19 PROCEDURE — 1160F RVW MEDS BY RX/DR IN RCRD: CPT | Performed by: NURSE PRACTITIONER

## 2023-12-19 PROCEDURE — 99214 OFFICE O/P EST MOD 30 MIN: CPT | Performed by: NURSE PRACTITIONER

## 2023-12-19 PROCEDURE — 1159F MED LIST DOCD IN RCRD: CPT | Performed by: NURSE PRACTITIONER

## 2023-12-19 NOTE — LETTER
2023     Judi Helm PA-C  [facility] [id number]    Patient: Trace Aranda   YOB: 1990   Date of Visit: 2023     Dear Judi Helm PA-C:       Thank you for referring Trace Aranda to me for evaluation. Below are the relevant portions of my assessment and plan of care.    If you have questions, please do not hesitate to call me. I look forward to following Trace along with you.         Sincerely,        Belkis Mcpherson DNP, APRN        CC: No Recipients    Belkis Mcpherson DNP, APRN  [id number]  Signed     Neuro Office Visit      Encounter Date: 2023   Patient Name: Trace Aranda  : 1990   MRN: [id number]   PCP: PRIYANKA Jack  Chief Complaint:    Chief Complaint   Patient presents with   • Headache       History of Present Illness: Trace Aranda is a 33 y.o. female who is here today in Neurology for  headache and pituitary adenoma.    Headache  Sudden onset of headaches in October. Denies trauma, infection. Her mother experienced the same type of headache that resolved after 6 months.    Headache Symptoms:   Days per month: 10-12  Location: Holocranial      Quality: Sharp        Duration: all day  Severity: mod to severe  Triggers: none  Associated Symptoms: Nausea, Vomiting, and Photophobia  Aura: none  Visual Changes: none    Abortives: excedrin, Tylenol and Advil  Preventives: none                                                                         MRI Brain With & Without Contrast (2023 16:27)-IMPRESSION:  Impression:  6 x 7 mm nodular finding along the right aspect of the pituitary demonstrating differential enhancement and some intrinsic T1 signal abnormality suggesting component of possible blood products. Finding is most consistent with probable pituitary   microadenoma with either small amount of intralesional hemorrhage or cystic component with proteinaceous contents. There is some associated  overall mild enlargement of the pituitary gland, without mass effect upon the adjacent optic chiasm. There is no   cavernous sinus invasion.    Pituitary Adenoma  Has been seen by NS  Progress Notes by Antony Pizano MD (12/05/2023 15:15) Recommends repeat MRI in 6 months and labs. Referred to Lorenzo for elevated prolactin.      Positive pregnancy test  HCG 19,175 Consistent with 6-7 week gestationhCG, Quantitative, Pregnancy (12/13/2023 15:34)     PMH: anemia, lumbago  FH: mother w headaches  Subjective      Past Medical History:   Past Medical History:   Diagnosis Date   • No pertinent past medical history        Past Surgical History:   Past Surgical History:   Procedure Laterality Date   • APPENDECTOMY  03/19/2007   • APPENDECTOMY         Family History:   Family History   Problem Relation Age of Onset   • No Known Problems Mother        Social History:   Social History     Socioeconomic History   • Marital status: Single   Tobacco Use   • Smoking status: Never     Passive exposure: Never   • Smokeless tobacco: Never   Vaping Use   • Vaping Use: Never used   Substance and Sexual Activity   • Alcohol use: Never     Alcohol/week: 1.0 standard drink of alcohol     Types: 1 Glasses of wine per week   • Drug use: Never   • Sexual activity: Yes     Partners: Male     Birth control/protection: None       Medications:   No current outpatient medications on file.    Allergies:   No Known Allergies    PHQ-9 Total Score:     STEADI Fall Risk Assessment has not been completed.    Objective     Physical Exam:   Physical Exam  Vitals and nursing note reviewed.   Constitutional:       General: She is not in acute distress.     Appearance: She is well-developed.   HENT:      Head: Normocephalic and atraumatic.      Nose: Nose normal.   Eyes:      General: No scleral icterus.        Right eye: No discharge.         Left eye: No discharge.      Conjunctiva/sclera: Conjunctivae normal.   Cardiovascular:      Rate and Rhythm:  "Normal rate.   Pulmonary:      Effort: Pulmonary effort is normal.   Musculoskeletal:         General: No deformity.      Cervical back: Neck supple.   Skin:     General: Skin is warm and dry.      Findings: No rash.   Neurological:      General: No focal deficit present.      Mental Status: She is alert and oriented to person, place, and time. Mental status is at baseline.      Motor: No abnormal muscle tone.   Psychiatric:         Mood and Affect: Mood normal.         Behavior: Behavior normal.         Thought Content: Thought content normal.         Judgment: Judgment normal.         Neurologic Exam     Mental Status   Oriented to person, place, and time.        Vital Signs:   Vitals:    12/19/23 1146   BP: 126/92   Pulse: 88   SpO2: 99%   Weight: 79.4 kg (175 lb 0.7 oz)   Height: 157.5 cm (62.01\")     Body mass index is 32.01 kg/m².     Results:   Imaging:   MRI Brain With & Without Contrast    Result Date: 11/21/2023  Impression: 6 x 7 mm nodular finding along the right aspect of the pituitary demonstrating differential enhancement and some intrinsic T1 signal abnormality suggesting component of possible blood products. Finding is most consistent with probable pituitary microadenoma with either small amount of intralesional hemorrhage or cystic component with proteinaceous contents. There is some associated overall mild enlargement of the pituitary gland, without mass effect upon the adjacent optic chiasm. There is no cavernous sinus invasion. Potentially incidental cerebellar tonsillar ectopia with around 6 mm of descent below the level of the foramen magnum. Electronically Signed: Stefan Turner MD  11/21/2023 4:51 PM EST  Workstation ID: [id number]    CT brain with IV contrast    Result Date: 11/3/2023  1. No arterial or venous abnormalities. There is no evidence of venous sinus thrombosis. 2. No acute intracranial abnormalities are identified on this enhanced examination. 3. Question nonenhancing 4 x 8 mm " focus in the right pituitary gland. Recommend MRI of the brain with pituitary protocol for further evaluation.    CTA brain    Result Date: 10/18/2023  No acute intracranial hemorrhage or large acute cortical infarct. No evidence of vascular injury, aneurysm, hemodynamically significant stenosis or major vessel occlusion of the intracranial arterial system. Images personally reviewed, interpreted and dictated by NIKO Villalta M.D.         Assessment / Plan      Assessment/Plan:   Diagnoses and all orders for this visit:    1. New onset of headaches (Primary)  Comments:  The only safe product she can use during pregnancy is Tylenol. Encourage hydration and eye exam. FU after delivery    2. Pituitary lesion  Comments:  Keep appt w Endo    3. Less than 8 weeks gestation of pregnancy  Comments:  Est care with OB           Patient Education:     Reviewed medications, potential side effects and signs and symptoms to report. Discussed risk versus benefits of treatment plan with patient and/or family-including medications, labs and radiology that may be ordered. Addressed questions and concerns during visit. Patient and/or family verbalized understanding and agree with plan. Instructed to call the office with any questions and report to ER with any life-threatening symptoms.     Follow Up:   Return if symptoms worsen or fail to improve.    During this visit the following were done:  Labs Reviewed [x]    Labs Ordered []    Radiology Reports Reviewed [x]    Radiology Ordered []    PCP Records Reviewed [x]    Referring Provider Records Reviewed [x]    ER Records Reviewed []    Hospital Records Reviewed []    History Obtained From Family []    Radiology Images Reviewed [x]    Other Reviewed []    Records Requested []      Belkis Mcpherson, DEWAYNE, APRN

## 2023-12-19 NOTE — PROGRESS NOTES
Neuro Office Visit      Encounter Date: 2023   Patient Name: Trace Aranda  : 1990   MRN: [id number]   PCP: PRIYANKA Jack  Chief Complaint:    Chief Complaint   Patient presents with    Headache       History of Present Illness: Trace Aranda is a 33 y.o. female who is here today in Neurology for  headache and pituitary adenoma.    Headache  Sudden onset of headaches in October. Denies trauma, infection. Her mother experienced the same type of headache that resolved after 6 months.    Headache Symptoms:   Days per month: 10-12  Location: Holocranial      Quality: Sharp        Duration: all day  Severity: mod to severe  Triggers: none  Associated Symptoms: Nausea, Vomiting, and Photophobia  Aura: none  Visual Changes: none    Abortives: excedrin, Tylenol and Advil  Preventives: none                                                                         MRI Brain With & Without Contrast (2023 16:27)-IMPRESSION:  Impression:  6 x 7 mm nodular finding along the right aspect of the pituitary demonstrating differential enhancement and some intrinsic T1 signal abnormality suggesting component of possible blood products. Finding is most consistent with probable pituitary   microadenoma with either small amount of intralesional hemorrhage or cystic component with proteinaceous contents. There is some associated overall mild enlargement of the pituitary gland, without mass effect upon the adjacent optic chiasm. There is no   cavernous sinus invasion.    Pituitary Adenoma  Has been seen by NS  Progress Notes by Antony Pizano MD (2023 15:15) Recommends repeat MRI in 6 months and labs. Referred to Endo for elevated prolactin.      Positive pregnancy test  HCG 19,175 Consistent with 6-7 week gestationhCG, Quantitative, Pregnancy (2023 15:34)     PMH: anemia, lumbago  FH: mother w headaches  Subjective      Past Medical History:   Past Medical History:   Diagnosis Date    No pertinent  past medical history        Past Surgical History:   Past Surgical History:   Procedure Laterality Date    APPENDECTOMY  03/19/2007    APPENDECTOMY         Family History:   Family History   Problem Relation Age of Onset    No Known Problems Mother        Social History:   Social History     Socioeconomic History    Marital status: Single   Tobacco Use    Smoking status: Never     Passive exposure: Never    Smokeless tobacco: Never   Vaping Use    Vaping Use: Never used   Substance and Sexual Activity    Alcohol use: Never     Alcohol/week: 1.0 standard drink of alcohol     Types: 1 Glasses of wine per week    Drug use: Never    Sexual activity: Yes     Partners: Male     Birth control/protection: None       Medications:   No current outpatient medications on file.    Allergies:   No Known Allergies    PHQ-9 Total Score:     [facility] [place] [facility] Assessment has not been completed.    Objective     Physical Exam:   Physical Exam  Vitals and nursing note reviewed.   Constitutional:       General: She is not in acute distress.     Appearance: She is well-developed.   HENT:      Head: Normocephalic and atraumatic.      Nose: Nose normal.   Eyes:      General: No scleral icterus.        Right eye: No discharge.         Left eye: No discharge.      Conjunctiva/sclera: Conjunctivae normal.   Cardiovascular:      Rate and Rhythm: Normal rate.   Pulmonary:      Effort: Pulmonary effort is normal.   Musculoskeletal:         General: No deformity.      Cervical back: Neck supple.   Skin:     General: Skin is warm and dry.      Findings: No rash.   Neurological:      General: No focal deficit present.      Mental Status: She is alert and oriented to person, place, and time. Mental status is at baseline.      Motor: No abnormal muscle tone.   Psychiatric:         Mood and Affect: Mood normal.         Behavior: Behavior normal.         Thought Content: Thought content normal.         Judgment: Judgment normal.         Neurologic Exam  "    Mental Status   Oriented to person, place, and time.        Vital Signs:   Vitals:    12/19/23 1146   BP: 126/92   Pulse: 88   SpO2: 99%   Weight: 79.4 kg (175 lb 0.7 oz)   Height: 157.5 cm (62.01\")     Body mass index is 32.01 kg/m².     Results:   Imaging:   MRI Brain With & Without Contrast    Result Date: 11/21/2023  Impression: 6 x 7 mm nodular finding along the right aspect of the pituitary demonstrating differential enhancement and some intrinsic T1 signal abnormality suggesting component of possible blood products. Finding is most consistent with probable pituitary microadenoma with either small amount of intralesional hemorrhage or cystic component with proteinaceous contents. There is some associated overall mild enlargement of the pituitary gland, without mass effect upon the adjacent optic chiasm. There is no cavernous sinus invasion. Potentially incidental cerebellar tonsillar ectopia with around 6 mm of descent below the level of the foramen magnum. Electronically Signed: Stefan Turner MD  11/21/2023 4:51 PM EST  Workstation ID: [id number]    CT brain with IV contrast    Result Date: 11/3/2023  1. No arterial or venous abnormalities. There is no evidence of venous sinus thrombosis. 2. No acute intracranial abnormalities are identified on this enhanced examination. 3. Question nonenhancing 4 x 8 mm focus in the right pituitary gland. Recommend MRI of the brain with pituitary protocol for further evaluation.    CTA brain    Result Date: 10/18/2023  No acute intracranial hemorrhage or large acute cortical infarct. No evidence of vascular injury, aneurysm, hemodynamically significant stenosis or major vessel occlusion of the intracranial arterial system. Images personally reviewed, interpreted and dictated by NIKO Villalta M.D.         Assessment / Plan      Assessment/Plan:   Diagnoses and all orders for this visit:    1. New onset of headaches (Primary)  Comments:  The only safe product she can use " during pregnancy is Tylenol. Encourage hydration and eye exam. FU after delivery    2. Pituitary lesion  Comments:  Keep appt w Endo    3. Less than 8 weeks gestation of pregnancy  Comments:  Est care with OB           Patient Education:     Reviewed medications, potential side effects and signs and symptoms to report. Discussed risk versus benefits of treatment plan with patient and/or family-including medications, labs and radiology that may be ordered. Addressed questions and concerns during visit. Patient and/or family verbalized understanding and agree with plan. Instructed to call the office with any questions and report to ER with any life-threatening symptoms.     Follow Up:   Return if symptoms worsen or fail to improve.    During this visit the following were done:  Labs Reviewed [x]    Labs Ordered []    Radiology Reports Reviewed [x]    Radiology Ordered []    PCP Records Reviewed [x]    Referring Provider Records Reviewed [x]    ER Records Reviewed []    Hospital Records Reviewed []    History Obtained From Family []    Radiology Images Reviewed [x]    Other Reviewed []    Records Requested []      Belkis Mcpherson, DEWAYNE, APRN

## 2023-12-27 ENCOUNTER — TELEPHONE (OUTPATIENT)
Dept: FAMILY MEDICINE CLINIC | Facility: CLINIC | Age: 33
End: 2023-12-27

## 2023-12-27 NOTE — TELEPHONE ENCOUNTER
Caller: Trace Aranda    Relationship: Self    Best call back number: [phone]    What is the medical concern/diagnosis: NEEDS CHECK UP    What specialty or service is being requested: DENTIST    Any additional details: PATIENT NEEDS REFERRAL FOR A DENTIST. PLEASE ADVISE

## 2023-12-27 NOTE — TELEPHONE ENCOUNTER
Caller: Trace Aranda    Relationship: Self    Best call back number: [phone]    What is the medical concern/diagnosis: RIGHT EYE PAINFUL AND RED    What specialty or service is being requested: OPTHALMOLOGY    Any additional details: PATIENT NEEDS REFERRAL PLEASE ADVISE

## 2024-01-03 ENCOUNTER — TELEMEDICINE (OUTPATIENT)
Dept: PSYCHIATRY | Facility: CLINIC | Age: 34
End: 2024-01-03
Payer: COMMERCIAL

## 2024-01-03 DIAGNOSIS — H57.11 PAIN IN RIGHT EYE: Primary | ICD-10-CM

## 2024-01-03 DIAGNOSIS — F41.1 GENERALIZED ANXIETY DISORDER: ICD-10-CM

## 2024-01-03 DIAGNOSIS — Z01.20 DENTAL EXAMINATION: ICD-10-CM

## 2024-01-03 DIAGNOSIS — F33.1 MAJOR DEPRESSIVE DISORDER, RECURRENT EPISODE, MODERATE: ICD-10-CM

## 2024-01-03 DIAGNOSIS — Z01.00 EYE EXAM, ROUTINE: ICD-10-CM

## 2024-01-03 PROCEDURE — 90832 PSYTX W PT 30 MINUTES: CPT | Performed by: COUNSELOR

## 2024-01-03 NOTE — PROGRESS NOTES
Date: January 3, 2024  Time In: 14:55 EST  Time Out: 15:20 EST    This provider is located at the [facility] (through [facility] [address], [place] [address] using a secure [employer] Video Visit through Connect. Patient is being seen remotely via telehealth at home address in [place] and stated they are in a secure environment for this session. The patient's condition being diagnosed/treated is appropriate for telemedicine. The provider identified herself as well as her credentials. The patient, and/or patients guardian, consent to be seen remotely, and when consent is given they understand that the consent allows for patient identifiable information to be sent to a third party as needed. They may refuse to be seen remotely at any time. The electronic data is encrypted and password protected, and the patient and/or guardian has been advised of the potential risks to privacy not withstanding such measures.     You have chosen to receive care through a telehealth visit.  Do you consent to use a video/audio connection for your medical care today? Yes    Subjective   Trace Aranda is a 33 y.o. female who presents today for follow up    Chief Complaint:   Chief Complaint   Patient presents with    Anxiety    Depression           Clinical Maneuvering/Intervention:      Assisted patient in processing above session content; acknowledged and normalized patient’s thoughts, feelings, and concerns.  Rationalized patient thought process regarding concerns presented at session.  Discussed triggers associated with patient's  anxiety  and depression  Also discussed coping skills for patient to implement such as self care , positive self talk , and spending time with her boyfriend.      Allowed patient to freely discuss issues without interruption or judgment. Provided safe, confidential environment to facilitate the development of positive therapeutic relationship and encourage  open, honest communication. Assisted patient in identifying risk factors which would indicate the need for higher level of care including thoughts to harm self or others and/or self-harming behavior and encouraged patient to contact this office, call 911, or present to the nearest emergency room should any of these events occur. Discussed crisis intervention services and means to access. Patient adamantly and convincingly denies current suicidal or homicidal ideation or perceptual disturbance.    Assessment:     Patient appears to maintain relative stability as compared to their baseline.  However, patient continues to struggle with depression and anxiety which continues to cause impairment in important areas of functioning.  A result, they can be reasonably expected to continue to benefit from treatment and would likely be at increased risk for decompensation otherwise.      PHQ-Score Total:  PHQ-9 Total Score: 18    SELENE-7 Score Total:  Over the last two weeks, how often have you been bothered by the following problems?  Feeling nervous, anxious or on edge: More than half the days  Not being able to stop or control worrying: More than half the days  Worrying too much about different things: More than half the days  Trouble Relaxing: Several days  Being so restless that it is hard to sit still: Several days  Becoming easily annoyed or irritable: More than half the days  Feeling afraid as if something awful might happen: Several days  SELENE 7 Total Score: 11  If you checked any problems, how difficult have these problems made it for you to do your work, take care of things at home, or get along with other people: Very difficult      Mental Status Exam:   Hygiene:   good  Cooperation:  Cooperative  Eye Contact:  Good  Psychomotor Behavior:  Appropriate  Affect:  Full range  Mood: normal  Speech:  Normal  Thought Process:  Goal directed  Thought Content:  Normal  Suicidal:  None  Homicidal:  None  Hallucinations:   None  Delusion:  None  Memory:  Intact  Orientation:  Person, Place, Time, and Situation  Reliability:  good  Insight:  Good  Judgement:  Good  Impulse Control:  Good  Physical/Medical Issues:  Yes Patient described her pain as headaches and having trouble resting while unable to work.        Patient's Support Network Includes:  significant other    Functional Status: Severe impairment    Progress toward goal: Not at goal    Prognosis: Guarded with Ongoing Treatment      Plan:    Patient will continue in individual outpatient therapy with focus on improved functioning and coping skills, maintaining stability, and avoiding decompensation and the need for higher level of care.    Patient will adhere to medication regimen as prescribed and report any side effects. Patient will contact this office, call 911 or present to the nearest emergency room should suicidal or homicidal ideations occur. Provide Cognitive Behavioral Therapy and Solution Focused Therapy to improve functioning, maintain stability, and avoid decompensation and the need for higher level of care.         VISIT DIAGNOSIS:     ICD-10-CM ICD-9-CM   1. Generalized anxiety disorder  F41.1 300.02   2. Major depressive disorder, recurrent episode, moderate  F33.1 296.32        Return in about 3 weeks (around 1/24/2024).    This document has been electronically signed by Abena Vanessa [facility]  January 3, 2024 15:22 EST     Part of this note may be an electronic transcription/translation of spoken language to printed text using the Dragon Dictation System.

## 2024-01-03 NOTE — LETTER
January 3, 2024     Trace Aranda    Patient: Trace Aranda   YOB: 1990   Date of Visit: 1/3/2024     Dear Trace MONSALVERoland Aranda:       Trace Aranda is seeing me for mental health therapy and has shown inability to work at this time for an indefinite period of time. This is based on her constantly having headaches and pain that causes her to have difficulty being mobile and being able to sit up for extended periods of time. She has not worked since October and receives no financial assistance. She needs help through [employer].     If you have questions, please do not hesitate to call me. My number is [phone], ext [phone].  I look forward to serving Trace with the intention of helping her recover to a point of being able to return to work.          Sincerely,        Abena Vanessa, [facility]        CC: No Recipients

## 2024-01-04 ENCOUNTER — TELEPHONE (OUTPATIENT)
Dept: FAMILY MEDICINE CLINIC | Facility: CLINIC | Age: 34
End: 2024-01-04

## 2024-01-04 NOTE — TELEPHONE ENCOUNTER
Caller: [employer] [facility]    Relationship:     Best call back number: [phone]     What form or medical record are you requesting: [employer] IS REQUESTING MEDICAL RECORDS FOR THE PATIENT FROM 11/01/2023- PRESENT BE FAXED     Who is requesting this form or medical record from you: [employer]     How would you like to receive the form or medical records (pick-up, mail, fax): FAX  If fax, what is the fax number: [phone]  If mail, what is the address:   If pick-up, provide patient with address and location details    Timeframe paperwork needed: ASAP    Additional notes: [facility] I FAXING A MEDICAL RECORDS REQUEST TO THE OFFICE THAT WILL NEED TO BE FAXED BACK QUICKLY IF POSSIBLE.

## 2024-01-10 ENCOUNTER — TELEPHONE (OUTPATIENT)
Dept: FAMILY MEDICINE CLINIC | Facility: CLINIC | Age: 34
End: 2024-01-10

## 2024-01-10 NOTE — TELEPHONE ENCOUNTER
Caller: Trace Aranda    Relationship: Self    Best call back number: [phone]     What form or medical record are you requesting: SOMETHING SAYING SHE'S PREGNANT    Who is requesting this form or medical record from you: [employer]     How would you like to receive the form or medical records (pick-up, mail, fax):      Timeframe paperwork needed: ASAP     Additional notes: NEEDS TO HAVE A WRITTEN STATEMENT SAYING SHE'S PREGNANT. NEEDS TO HAVE HER FULL NAME ON IT. ALSO NEEDS TO HAVE THE [employer] NEEDS TO HAVE THE DATE CORRECTED.

## 2024-01-10 NOTE — TELEPHONE ENCOUNTER
I sent a letter to her MyChart stating she is pregnant according to testing done here. I will need the papers she wants dates changed on brought in and have her write exactly what dates are needed.

## 2024-01-11 NOTE — TELEPHONE ENCOUNTER
HUB TO RELAY    Lm    Per Judi    I sent a letter to her MyChart stating she is pregnant according to testing done here. I will need the papers she wants dates changed on brought in and have her write exactly what dates are needed.

## 2024-01-12 ENCOUNTER — OFFICE VISIT (OUTPATIENT)
Dept: ENDOCRINOLOGY | Facility: CLINIC | Age: 34
End: 2024-01-12
Payer: COMMERCIAL

## 2024-01-12 VITALS
HEIGHT: 62 IN | OXYGEN SATURATION: 99 % | SYSTOLIC BLOOD PRESSURE: 124 MMHG | WEIGHT: 178 LBS | BODY MASS INDEX: 32.76 KG/M2 | HEART RATE: 70 BPM | DIASTOLIC BLOOD PRESSURE: 70 MMHG

## 2024-01-12 DIAGNOSIS — E23.7 PITUITARY LESION: Primary | ICD-10-CM

## 2024-01-12 DIAGNOSIS — N93.9 VAGINAL SPOTTING: ICD-10-CM

## 2024-01-12 DIAGNOSIS — R79.89 ELEVATED PROLACTIN LEVEL: ICD-10-CM

## 2024-01-12 PROCEDURE — 99204 OFFICE O/P NEW MOD 45 MIN: CPT | Performed by: INTERNAL MEDICINE

## 2024-01-12 NOTE — PROGRESS NOTES
Chief Complaint   Patient presents with    Pituitary Problem        New patient who is being seen in consultation regarding a pituitary lesion at the request of No ref. provider found     LUDMILA Aranda is a 33 y.o. female who presents for evaluation of pituitary lesion.    Patient underwent MRI of the brain in December secondary to headache.  She reports that this has been a persistent issue since that time and is not changed.  She has not had any vision changes.  She was referred to neurosurgery after MRI noted a 6 x 7 nodular finding on the right aspect of the pituitary.  Neurosurgery is planning to repeat imaging in 6 months.    Patient did have laboratory evaluation following neurosurgery appointment which was significant for elevated prolactin.  Further testing in mid December revealed that she is pregnant.  She estimates she is approximately 10 weeks gestation.  She has not yet seen OB.  She does have a history of prior miscarriage at approximately 7 weeks, this occurred in May of last year.  Patient reports that menstrual periods were occurring regularly prior to pregnancy.  She has had some spotting the past couple of days.  She does report some breast discharge, she reports that she believed she had this prior to diagnosis of pregnancy.  She does report weight gain over the past few years of approximately 40 pounds, this occurred for normal only after she was largely immobile for a period of time due to leg injury.    Past Medical History:   Diagnosis Date    No pertinent past medical history      Past Surgical History:   Procedure Laterality Date    APPENDECTOMY  03/19/2007    APPENDECTOMY        Family History   Problem Relation Age of Onset    No Known Problems Mother     No Known Problems Father     No Known Problems Sister     No Known Problems Sister     No Known Problems Brother     No Known Problems Brother       Social History     Socioeconomic History    Marital status: Single   Tobacco Use  "   Smoking status: Never     Passive exposure: Never    Smokeless tobacco: Never   Vaping Use    Vaping Use: Never used   Substance and Sexual Activity    Alcohol use: Never     Alcohol/week: 1.0 standard drink of alcohol     Types: 1 Glasses of wine per week    Drug use: Never    Sexual activity: Yes     Partners: Male     Birth control/protection: None      No Known Allergies   No current outpatient medications on file prior to visit.     No current facility-administered medications on file prior to visit.        Review of Systems   HENT:  Positive for nosebleeds.    Eyes:  Positive for pain, redness and itching.   Genitourinary:  Positive for breast discharge.   Neurological:  Positive for dizziness, weakness and headache.        Vitals:    01/12/24 1254   BP: 124/70   BP Location: Right arm   Patient Position: Sitting   Cuff Size: Adult   Pulse: 70   SpO2: 99%   Weight: 80.7 kg (178 lb)   Height: 157.5 cm (62\")   Body mass index is 32.56 kg/m².     Physical Exam  Vitals reviewed.   Neck:      Thyroid: No thyromegaly.   Cardiovascular:      Rate and Rhythm: Normal rate and regular rhythm.      Heart sounds: No murmur heard.  Skin:     General: Skin is warm and dry.   Neurological:      General: No focal deficit present.      Mental Status: She is alert.   Psychiatric:         Mood and Affect: Mood and affect normal.        Labs/Imaging  MRI BRAIN W WO CONTRAST     Date of Exam: 11/21/2023 3:06 PM EST     Indication: Headache, sudden, severe.     Comparison: None available.     Technique:  Routine multiplanar/multisequence sequence images of the brain were obtained before and after the uneventful administration of 15 mL  Multihance.        Findings:   No acute infarct is present on diffusion weighted sequences. The craniocervical junction demonstrates mild cerebellar tonsillar ectopia, with around 6 mm of descent. Gray-white differentiation is maintained, without evidence of intracranial hemorrhage or   mass " effect. There is no abnormal brain parenchymal enhancement. The ventricles are normal in size and configuration. The orbits are normal. The paranasal sinuses appear clear. There is no abnormal brain parenchymal enhancement. Dedicated dynamic   contrast-enhanced evaluation of the pituitary and sella demonstrates mild prominence of the pituitary measuring 7 mm craniocaudal, without significant suprasellar mass effect. There is an area of intrinsic T1 hyperintensity seen along the right aspect of   the pituitary, somewhat ovoid measuring 6 x 7 mm in the coronal plane. This region demonstrates differential enhancement on dynamic imaging. The infundibulum is midline. The optic chiasm is not compressed. The cavernous sinuses appear normal.     IMPRESSION:  Impression:  6 x 7 mm nodular finding along the right aspect of the pituitary demonstrating differential enhancement and some intrinsic T1 signal abnormality suggesting component of possible blood products. Finding is most consistent with probable pituitary   microadenoma with either small amount of intralesional hemorrhage or cystic component with proteinaceous contents. There is some associated overall mild enlargement of the pituitary gland, without mass effect upon the adjacent optic chiasm. There is no   cavernous sinus invasion.     Potentially incidental cerebellar tonsillar ectopia with around 6 mm of descent below the level of the foramen magnum.           Electronically Signed: Stefan Turner MD    11/21/2023 4:51 PM EST    Workstation ID: [id number]     Latest Reference Range & Units 12/05/23 16:19 12/13/23 15:34   ACTH 7.2 - 63.3 pg/mL 10.5    Cortisol mcg/dL 9.14    Growth Hormone 0.0 - 10.0 ng/mL 0.1    Insulin-Like Growth Factor-1 84 - 281 ng/mL 183    Prolactin 4.79 - 23.30 ng/mL 72.60 (H)    TSH Baseline 0.270 - 4.200 uIU/mL 3.820    T4, Total 4.50 - 11.70 mcg/dL 7.33    T Uptake 0.80 - 1.30 TBI 1.12    HCG Quantitative mIU/mL  19,175.00   (H): Data is  abnormally high    Assessment and Plan    Diagnoses and all orders for this visit:    1. Pituitary lesion (Primary)  -     TSH; Future  -     T4, Free; Future  -     Basic Metabolic Panel; Future  Noted on MRI obtained secondary to headache.  MRI showed 6 x 7 mm nodular finding on the right side of the pituitary consistent with microadenoma potentially with small hemorrhage versus cystic component.  Reviewed typical follow up of pituitary lesions.  Patient was evaluated by neurosurgery who recommended repeat imaging in 6 months.  I did discuss with patient that given pregnancy, they will likely want to delay imaging and recommended she contact that office.  Prior laboratory evaluation reviewed.  ACTH, cortisol, IGF-I and thyroid function testing were unremarkable.  Prolactin level is elevated but this could be due to pregnancy.  Reviewed changes which are expected to pituitary hormones during pregnancy.  Will plan to repeat evaluation of all hormonal axes following delivery.  Plan to check BMP to assess electrolytes.  Repeat thyroid function testing given pregnancy.  Discussed signs and symptoms of pituitary apoplexy and when to seek care.    2. Elevated prolactin level  -     Prolactin  Patient does report milky discharge from the breast bilaterally.  We did review that prolactin elevation could be secondary to pregnancy.  Patient thinks that discharge may have been present prior to pregnancy.  Discussed that we will repeat evaluation following delivery.  We would not recommend any therapy to lower prolactin during pregnancy.    3. Vaginal spotting  Patient had positive pregnancy test in December 2023.  Per her estimation she is approximately 10 weeks gestation.  She has not yet seen OB.  She reports a small amount of spotting the past couple of days.  She was instructed to reach out to OB/GYN for appointment.  She reports her PCP did make a referral.  Reviewed with patient that if spotting continues, she should  seek evaluation, patient reports she will go to the ER if this is ongoing. She has had a prior first trimester miscarriage.      Return in about 9 months (around 10/12/2024) for Next scheduled follow up. The patient was instructed to contact the clinic with any interval questions or concerns.    Promise Crocker MD     Dictated Utilizing Dragon Dictation

## 2024-01-15 NOTE — TELEPHONE ENCOUNTER
Patient called today and wants to know if her [employer] paperwork is ready to be picked.  Please call and let her know.

## 2024-01-16 ENCOUNTER — TELEPHONE (OUTPATIENT)
Dept: FAMILY MEDICINE CLINIC | Facility: CLINIC | Age: 34
End: 2024-01-16
Payer: COMMERCIAL

## 2024-01-16 NOTE — TELEPHONE ENCOUNTER
Spoke with the pt in regards to the paper work we received for her FMLA  I advised the pt we will need her to come in for an apt so we can fill out the new paper work from her employer   Apt made 01/17/2024

## 2024-01-17 ENCOUNTER — OFFICE VISIT (OUTPATIENT)
Dept: FAMILY MEDICINE CLINIC | Facility: CLINIC | Age: 34
End: 2024-01-17
Payer: COMMERCIAL

## 2024-01-17 VITALS
BODY MASS INDEX: 32.9 KG/M2 | SYSTOLIC BLOOD PRESSURE: 100 MMHG | HEART RATE: 68 BPM | HEIGHT: 62 IN | TEMPERATURE: 97.7 F | DIASTOLIC BLOOD PRESSURE: 80 MMHG | WEIGHT: 178.8 LBS

## 2024-01-17 DIAGNOSIS — Z3A.10 10 WEEKS GESTATION OF PREGNANCY: ICD-10-CM

## 2024-01-17 DIAGNOSIS — R51.9 SUDDEN ONSET OF SEVERE HEADACHE: Primary | ICD-10-CM

## 2024-01-17 PROCEDURE — 99213 OFFICE O/P EST LOW 20 MIN: CPT | Performed by: PHYSICIAN ASSISTANT

## 2024-01-17 PROCEDURE — 1160F RVW MEDS BY RX/DR IN RCRD: CPT | Performed by: PHYSICIAN ASSISTANT

## 2024-01-17 PROCEDURE — 1159F MED LIST DOCD IN RCRD: CPT | Performed by: PHYSICIAN ASSISTANT

## 2024-01-18 ENCOUNTER — TELEPHONE (OUTPATIENT)
Dept: FAMILY MEDICINE CLINIC | Facility: CLINIC | Age: 34
End: 2024-01-18
Payer: COMMERCIAL

## 2024-01-18 NOTE — TELEPHONE ENCOUNTER
PATIENT CALLED AND SAID WILLIAM DID NOT RECEIVE PAPERWORK.  OTHER PLACE DID RECEIVE THE PAPERWORK BUT [facility] STILL NEEDS IT SO SHE CAN GET PAID.  PLEASE CALL PATIENT AND LET HER KNOW WHEN THIS HAS BEEN FAXED.

## 2024-01-19 NOTE — PROGRESS NOTES
"     Follow Up Office Visit      Date: 2024   Patient Name: Trace Aranda  : 1990   MRN: [id number]     Chief Complaint:    Chief Complaint   Patient presents with    Headache       History of Present Illness: Trace Aranda is a 33 y.o. female who is here today to follow up with paperwork.  We had filled out paperwork for her previously a few months ago after she developed severe headaches with an acute onset.  Headaches have continued and she has had imaging studies and seen specialist however she is now pregnant and this her medication choices are quite limited.  She did receive more paperwork that her employer wanted filled out and it was more extensive than the initial paperwork so I did have her come in just so I can ask any questions I needed as I fill this out.      Subjective      Review of systems:  Review of Systems     I have reviewed and the following portions of the patient's history were updated as appropriate: past family history, past medical history, past social history, past surgical history and problem list.    Medications:   No current outpatient medications on file.    Allergies:   No Known Allergies    Objective     Vital Signs:   Vitals:    24 1017   BP: 100/80   Pulse: 68   Temp: 97.7 °F (36.5 °C)   TempSrc: Temporal   Weight: 81.1 kg (178 lb 12.8 oz)   Height: 157.5 cm (62.01\")   PainSc:   8   PainLoc: Head     Body mass index is 32.69 kg/m².          Physical Exam:   Physical Exam  Vitals and nursing note reviewed.   Constitutional:       Appearance: Normal appearance.   Neurological:      Mental Status: She is alert.     We go over the paperwork and has like to list out she helps me answer the questions as far as her functional abilities with the headaches and history.    Procedures     Assessment / Plan      Assessment/Plan:   Diagnoses and all orders for this visit:    1. Sudden onset of severe headache (Primary)    2. 10 weeks gestation of pregnancy  -     Ambulatory " Referral to Obstetrics / Gynecology    Paperwork filled out and will be faxed today.  She is about 10 weeks along with her current pregnancy.  Will refer to OB for her ongoing care.    Follow Up:   No follow-ups on file.    Judi Helm PA-C   [facility]

## 2024-02-12 ENCOUNTER — TELEMEDICINE (OUTPATIENT)
Dept: FAMILY MEDICINE CLINIC | Facility: CLINIC | Age: 34
End: 2024-02-12
Payer: COMMERCIAL

## 2024-02-12 DIAGNOSIS — N94.89 UTERINE CRAMPING: ICD-10-CM

## 2024-02-12 DIAGNOSIS — O03.9 MISCARRIAGE: Primary | ICD-10-CM

## 2024-02-12 RX ORDER — ACETAMINOPHEN AND CODEINE PHOSPHATE 300; 30 MG/1; MG/1
1 TABLET ORAL EVERY 6 HOURS PRN
Qty: 20 TABLET | Refills: 0 | Status: SHIPPED | OUTPATIENT
Start: 2024-02-12 | End: 2024-02-14 | Stop reason: RX

## 2024-02-13 ENCOUNTER — LAB (OUTPATIENT)
Dept: LAB | Facility: HOSPITAL | Age: 34
End: 2024-02-13
Payer: COMMERCIAL

## 2024-02-13 ENCOUNTER — TELEPHONE (OUTPATIENT)
Dept: FAMILY MEDICINE CLINIC | Facility: CLINIC | Age: 34
End: 2024-02-13
Payer: COMMERCIAL

## 2024-02-13 DIAGNOSIS — E23.7 PITUITARY LESION: ICD-10-CM

## 2024-02-13 PROCEDURE — 84146 ASSAY OF PROLACTIN: CPT | Performed by: INTERNAL MEDICINE

## 2024-02-13 PROCEDURE — 80048 BASIC METABOLIC PNL TOTAL CA: CPT

## 2024-02-13 PROCEDURE — 84439 ASSAY OF FREE THYROXINE: CPT

## 2024-02-13 PROCEDURE — 84443 ASSAY THYROID STIM HORMONE: CPT

## 2024-02-14 ENCOUNTER — OFFICE VISIT (OUTPATIENT)
Dept: ENDOCRINOLOGY | Facility: CLINIC | Age: 34
End: 2024-02-14
Payer: COMMERCIAL

## 2024-02-14 VITALS
BODY MASS INDEX: 33.49 KG/M2 | OXYGEN SATURATION: 94 % | HEART RATE: 94 BPM | DIASTOLIC BLOOD PRESSURE: 74 MMHG | WEIGHT: 182 LBS | HEIGHT: 62 IN | SYSTOLIC BLOOD PRESSURE: 100 MMHG

## 2024-02-14 DIAGNOSIS — R79.89 ELEVATED PROLACTIN LEVEL: ICD-10-CM

## 2024-02-14 DIAGNOSIS — R94.6 ABNORMAL THYROID FUNCTION TEST: ICD-10-CM

## 2024-02-14 DIAGNOSIS — N94.89 UTERINE CRAMPING: Primary | ICD-10-CM

## 2024-02-14 DIAGNOSIS — E23.7 PITUITARY LESION: Primary | ICD-10-CM

## 2024-02-14 LAB
ANION GAP SERPL CALCULATED.3IONS-SCNC: 10.1 MMOL/L (ref 5–15)
BUN SERPL-MCNC: 9 MG/DL (ref 6–20)
BUN/CREAT SERPL: 12.2 (ref 7–25)
CALCIUM SPEC-SCNC: 9.2 MG/DL (ref 8.6–10.5)
CHLORIDE SERPL-SCNC: 105 MMOL/L (ref 98–107)
CO2 SERPL-SCNC: 24.9 MMOL/L (ref 22–29)
CREAT SERPL-MCNC: 0.74 MG/DL (ref 0.57–1)
EGFRCR SERPLBLD CKD-EPI 2021: 110.4 ML/MIN/1.73
GLUCOSE SERPL-MCNC: 102 MG/DL (ref 65–99)
POTASSIUM SERPL-SCNC: 3.7 MMOL/L (ref 3.5–5.2)
PROLACTIN SERPL-MCNC: 49.9 NG/ML (ref 4.79–23.3)
SODIUM SERPL-SCNC: 140 MMOL/L (ref 136–145)
T4 FREE SERPL-MCNC: 0.97 NG/DL (ref 0.93–1.7)
TSH SERPL DL<=0.05 MIU/L-ACNC: 6.85 UIU/ML (ref 0.27–4.2)

## 2024-02-14 PROCEDURE — 99214 OFFICE O/P EST MOD 30 MIN: CPT | Performed by: INTERNAL MEDICINE

## 2024-02-14 RX ORDER — CABERGOLINE 0.5 MG/1
0.25 TABLET ORAL 2 TIMES WEEKLY
Qty: 8 TABLET | Refills: 1 | Status: SHIPPED | OUTPATIENT
Start: 2024-02-15

## 2024-02-14 RX ORDER — NAPROXEN SODIUM 550 MG/1
550 TABLET ORAL 2 TIMES DAILY PRN
Qty: 60 TABLET | Refills: 0 | Status: SHIPPED | OUTPATIENT
Start: 2024-02-14

## 2024-02-18 PROBLEM — Z01.419 WELL WOMAN EXAM: Status: ACTIVE | Noted: 2024-02-18

## 2024-02-18 PROBLEM — E61.1 IRON DEFICIENCY: Status: RESOLVED | Noted: 2022-04-22 | Resolved: 2024-02-18

## 2024-02-18 PROBLEM — O03.4 RETAINED PRODUCTS OF CONCEPTION AFTER MISCARRIAGE: Status: RESOLVED | Noted: 2023-02-12 | Resolved: 2024-02-18

## 2024-02-19 ENCOUNTER — TELEPHONE (OUTPATIENT)
Dept: FAMILY MEDICINE CLINIC | Facility: CLINIC | Age: 34
End: 2024-02-19
Payer: COMMERCIAL

## 2024-02-20 NOTE — TELEPHONE ENCOUNTER
Caller: MARY WITH [employer]     Relationship: [employer]     Best call back number:  [phone]       Who are you requesting to speak with (clinical staff, provider,  specific staff member): CLINICAL     What was the call regarding: CALLING TO SCHEDULE A PEER TO PEER FOR SHORT TERM DISABILITY     WITH PHYSICIAN JENNIFER RATLIFF   CONTACT [phone]  REFERENCE NUMBER  [id number]

## 2024-02-21 NOTE — TELEPHONE ENCOUNTER
Caller: ABDULAZIZ WITH [employer]     Relationship: [employer]     Best call back number: [phone]     What is the best time to reach you: ANY    Who are you requesting to speak with (clinical staff, provider,  specific staff member): RAYMON BARRERA    What was the call regarding: ASKING TO SCHEDULE THE PEER TO PEER- HAS NOT HEARD BACK. PLEASE CALL

## 2024-02-22 ENCOUNTER — LAB (OUTPATIENT)
Dept: LAB | Facility: HOSPITAL | Age: 34
End: 2024-02-22
Payer: COMMERCIAL

## 2024-02-22 ENCOUNTER — OFFICE VISIT (OUTPATIENT)
Dept: OBSTETRICS AND GYNECOLOGY | Facility: CLINIC | Age: 34
End: 2024-02-22
Payer: COMMERCIAL

## 2024-02-22 VITALS
RESPIRATION RATE: 14 BRPM | BODY MASS INDEX: 33.47 KG/M2 | DIASTOLIC BLOOD PRESSURE: 72 MMHG | WEIGHT: 183 LBS | SYSTOLIC BLOOD PRESSURE: 110 MMHG

## 2024-02-22 DIAGNOSIS — Z71.3 NUTRITIONAL COUNSELING: ICD-10-CM

## 2024-02-22 DIAGNOSIS — O03.9 COMPLETE MISCARRIAGE: Primary | ICD-10-CM

## 2024-02-22 DIAGNOSIS — O03.9 COMPLETE MISCARRIAGE: ICD-10-CM

## 2024-02-22 PROBLEM — D35.2 PROLACTINOMA: Status: ACTIVE | Noted: 2023-12-01

## 2024-02-22 LAB — HCG INTACT+B SERPL-ACNC: <1 MIU/ML

## 2024-02-22 PROCEDURE — 84702 CHORIONIC GONADOTROPIN TEST: CPT

## 2024-02-22 PROCEDURE — 36415 COLL VENOUS BLD VENIPUNCTURE: CPT

## 2024-02-22 NOTE — PROGRESS NOTES
Subjective   Chief Complaint   Patient presents with    Miscarriage       Trace Aranda is a 33 y.o. year old .  Patient's last menstrual period was 10/30/2023 (approximate).  She comes today referred by her primary care related to 2 recent miscarriages.  She had a miscarriage in the spring 2023 and another 1 recently in January.  She has had no follow-up lab work or ultrasound since the most recent miscarriage.  Neither miscarriage required surgery.  She also was recently diagnosed as having a prolactinoma.  She has had a headache.  She is on Dostinex.  Brain MRI is consistent with a microadenoma.  She is seeing endocrinology for management of this medication.  She also has had her thyroid checked.  At times it has been elevated but she is always had normal free T4's.    She is sexually active.  She is not currently interested in getting pregnant.  For contraception the only thing she is doing is timed intercourse.    The following portions of the patient's history were reviewed and updated as appropriate:current medications and allergies    Social History    Tobacco Use      Smoking status: Never      Smokeless tobacco: Never         Objective   /72   Resp 14   Wt 83 kg (183 lb)   LMP 10/30/2023 (Approximate)   BMI 33.47 kg/m²     Lab Review   See HPI    Imaging   See HPI       Assessment   Pituitary microadenoma on Dostinex followed by endocrinology  History of miscarriage x 2 without appropriate follow-up on the recent miscarriage  Subclinical hypothyroidism currently on medication  Sub-optimal BC - she currently is not taking sufficient folic acid     Plan   Explained the 2 miscarriages in and of itself do not typically increase the risk of subsequent miscarriage  I do believe the correction of her pituitary microadenoma with hyperprolactinemia  Folic acid for the prevention of neural tube defects was discussed.  At least 0.4 mg should be taken preconceptionally to reduce the risk.  It was  explained that this may reduce the baseline incidence of neural tube defect by as much as 65%.  Folic acid can be found in OTC multivitamins, OTC prenatal vitamins or breakfast cereal that that contains 100% of the RDA of folate.  The following tests were ordered today: HCG.  It was explained to Trace that all lab test should be back within the one week after they are performed. She will be notified about the results, regardless of the findings. If she has not been contacted by the office within 2 weeks after the test has been performed, it is her responsibility to contact us to learn about her results.  Ultrasound needs to be done ASAP.   The importance of keeping all planned follow-up and taking all medications as prescribed was emphasized.  Follow up after ultrasound             This note was electronically signed.    Oni Cunningham M.D.  February 22, 2024    Total time spent today with Trace  was 30 minutes (level 3) - pathophysiology of her presenting problem along with plans for any diagnositc work-up and treatment.  The time reported only includes face-to-face time and excludes any procedural based activities that occurred on the same date of service.    Part of this note may be an electronic transcription/translation of spoken language to printed text using the Dragon Dictation System.

## 2024-02-22 NOTE — PATIENT INSTRUCTIONS
Preventing Birth Defects with Folic Acid  What is folic acid?  Folic acid is a vitamin that is used by the body to create new cells and keep the blood healthy. Everyone needs folic acid to stay healthy. It is especially important if you are pregnant.  Folic acid is artificial (synthetic). The vitamin in its natural form is called folate. Some foods are natural sources of folate, and other foods have folic acid added to them (fortified foods). You can also buy folic acid supplements or vitamins that contain folic acid.  Why is folic acid important during pregnancy?  Folic acid helps reduce your baby's risk of serious birth defects, especially the class of birth defects called neural tube defects (NTD).  Types of NTD's include:  Spina bifida. Spina bifida occurs when a baby's spinal column does not develop completely. This can cause serious, long-term (chronic) disabilities.  Anencephaly. This is a birth defect that causes your baby to be missing parts of the brain, scalp, and skull when he or she is born. Most babies born with anencephaly only live for a short amount of time.  How much folic acid do I need?  If you plan to become pregnant, you should take at least 400 mcg (micrograms) of folic acid daily. Birth defects usually occur in the earliest stages of pregnancy, often before you know you are pregnant. Taking folic acid when you start trying to become pregnant can help prevent birth defects.  While you are pregnant, you need at least 600 mcg of folic acid each day.  If you have had a child with a NTD, you may be recommended you take at least 5,000 mcg of folic acid daily.  If you take certain medications (certain medications for control of seizures) you made need more the 400 mcg.  What nutrition changes can be made?  To increase your intake of folate and folic acid, you may:  Eat more foods that are fortified with folic acid. Check food labels to see whether a food contains folic acid. Foods that are commonly  fortified with folic acid include:  Cereals.  Pastas.  Flours.  White rice.  Breads.  Eat more foods that are natural sources of folate, such as:  Legumes, including lentils, peas, and beans.  Nuts.  Vegetables, especially dark green leafy vegetables, such as spinach and mustard greens.  Citrus fruits and juices.  Your health care provider may still recommend that you take a supplement to make sure that you get enough to reduce the risk of birth defects.  Where to find support  Talk with your health care provider or your pharmacist to find a folic acid supplement that is right for you.  Your health care provider may recommend that you see a nutrition specialist (nutritionist). A nutritionist can help you make healthy food choices and get more folate from your diet.  Nutrition education programs may be available through your community health department.  Where to find more information  Visit the following websites to learn more about the importance of folic acid.  Centers for Disease Control and Prevention: www.cdc.gov/ncbddd/folicacid/about.html  The Office on Women's Health: womenshResonant Incth.gov/publications/our-publications/fact-sheet/folic-acid.html  National Institutes of Health: ods.od.nih.gov/factsheets/Folate-Consumer  The March of Dimes: www.marchofdimes.org/pregnancy/folic-acid.aspx  Summary  It is important to start taking folic acid when you are planning to become pregnant, because many birth defects can happen before you know that you are pregnant.  You can get more folate and folic acid from your diet. Look for certain foods that are fortified with folic acid.  Your health care provider may recommend that you take a supplement to get enough folic acid.    This information is not intended to replace advice given to you by your health care provider. Make sure you discuss any questions you have with your health care provider.  Document Released: 01/18/2017 Document Revised: 11/30/2018 Document Reviewed:  01/18/2017  Elsevier Patient Education © 2020 [employer].

## 2024-02-23 ENCOUNTER — TELEPHONE (OUTPATIENT)
Dept: FAMILY MEDICINE CLINIC | Facility: CLINIC | Age: 34
End: 2024-02-23
Payer: COMMERCIAL

## 2024-02-23 NOTE — TELEPHONE ENCOUNTER
Patient is waiting for paperwork and wants to know when this will be completed, her deadline was today

## 2024-03-25 ENCOUNTER — TELEMEDICINE (OUTPATIENT)
Dept: FAMILY MEDICINE CLINIC | Facility: CLINIC | Age: 34
End: 2024-03-25
Payer: COMMERCIAL

## 2024-03-25 DIAGNOSIS — G43.809 OTHER MIGRAINE WITHOUT STATUS MIGRAINOSUS, NOT INTRACTABLE: Primary | ICD-10-CM

## 2024-03-25 DIAGNOSIS — D35.2 PROLACTINOMA: ICD-10-CM

## 2024-03-25 RX ORDER — ATOGEPANT 60 MG/1
60 TABLET ORAL DAILY
Qty: 30 TABLET | Refills: 5 | Status: SHIPPED | OUTPATIENT
Start: 2024-03-25

## 2024-03-25 NOTE — PROGRESS NOTES
"     Telehealth E-Visit      Patient Name: Trace Aranda  : 1990   MRN: [id number]     Chief Complaint:  No chief complaint on file.      I have reviewed the E-Visit questionnaire and the patient's answers, my assessment and plan are listed below.     History of Present Illness: Trace Aranda is a 34 y.o. female who is doing a telehealth visit today. The patient is at home in [place] and provider is in the office.    The patient reports she has not been feeling well. She has been in [place] because her family is there to help. She has not been able to seek any medical treatment through Medicare while out of state. She was seen at the emergency department last 2024, but she could not come to the office.     The patient complains of \"burning\" pain in the waist and significant pain in her back. She has difficulty sitting down. She often lays on her stomach. The patient recalls her back pain is similar to the pain she felt after lumbar puncture in the past. She denies any rash in the affected region.    At her last appointment, the gynecologist advised her to call and schedule an appointment for ultrasound prior to further treatment. She has completed laboratory studies. She plans to schedule an appointment to complete the ultrasound at Dr. Oni Cunningham' office tomorrow. She reports noticing increased tissue in her menses. Her pregnancy test was negative.    Her headaches have been severe. She has an appointment with the headache doctor on 2024.    She is still on cabergoline as prescribed by her endocrinologist. She last took one tablet last week. It has been significantly effective in helping suppress lactation. She takes it twice a week, but taking this medication causes her to \"not feel like herself\" and stay in bed for at least 24 hours. The patient reports this medication is helping overall.      Subjective      Review of Systems:   Review of Systems   Constitutional:  " Negative for fatigue and fever.   HENT:  Negative for trouble swallowing.    Eyes:  Negative for visual disturbance.   Respiratory:  Negative for cough and shortness of breath.    Cardiovascular:  Negative for chest pain and leg swelling.   Gastrointestinal:  Negative for abdominal pain.   Musculoskeletal:  Positive for back pain and myalgias.   Skin:  Negative for rash.   Neurological:  Positive for headache.       I have reviewed and the following portions of the patient's history were updated as appropriate: past family history, past medical history, past social history, past surgical history and problem list.    Medications:     Current Outpatient Medications:     cabergoline (DOSTINEX) 0.5 MG tablet, Take 0.5 tablets by mouth 2 (Two) Times a Week., Disp: 8 tablet, Rfl: 1    naproxen sodium (Anaprox DS) 550 MG tablet, Take 1 tablet by mouth 2 (Two) Times a Day As Needed for Mild Pain or Headache., Disp: 60 tablet, Rfl: 0    Allergies:   No Known Allergies    Objective     Physical Exam:  Vital Signs: There were no vitals filed for this visit.  There is no height or weight on file to calculate BMI.    Physical Exam  This is a telehealth audiovisual visit.  Speech normal.  No dyspnea.  Affect normal.    Assessment / Plan      Assessment/Plan:   Diagnoses and all orders for this visit:    1. Other migraine without status migrainosus, not intractable (Primary)    2. Prolactinoma    Other orders  -     Atogepant (Qulipta) 60 MG tablet; Take 1 tablet by mouth Daily.  Dispense: 30 tablet; Refill: 5         1. Other migraine  She will try Qulipta once daily to see if this helps with the chronic headaches. She has an appointment with neurology in 05/2024, but I did tell her to call and see if there are any cancellations that could move it sooner.    2. Miscarriage  She is also going to call her GYN tomorrow. They have asked her to make an appointment for ultrasound to make sure that there are no retained tissue from the  miscarriage. We will see how things go, and she will follow up with me in about 1 month or so.      Follow Up:   No follow-ups on file.    Any medications prescribed have been sent electronically to   [facility]/pharmacy #[id number] - [place], [place] - [address] - [phone] PH - [phone] FX  [address]  [facility] [address]  Phone: [phone] Fax: [phone]      15 minutes were spent reviewing the patient's questionnaire, formulating a treatment plan, and relaying information to the patient via [employer].    Judi Helm PA-C  [facility] [place]   03/25/24  17:23 EDT    Transcribed from ambient dictation for Judi Helm PA-C by Yudy Wilkes.  03/25/24   19:06 EDT    Patient or patient representative verbalized consent to the visit recording.  I have personally performed the services described in this document as transcribed by the above individual, and it is both accurate and complete.

## 2024-03-27 ENCOUNTER — TELEPHONE (OUTPATIENT)
Dept: ENDOCRINOLOGY | Facility: CLINIC | Age: 34
End: 2024-03-27
Payer: COMMERCIAL

## 2024-03-27 ENCOUNTER — PRIOR AUTHORIZATION (OUTPATIENT)
Dept: FAMILY MEDICINE CLINIC | Facility: CLINIC | Age: 34
End: 2024-03-27
Payer: COMMERCIAL

## 2024-03-27 NOTE — TELEPHONE ENCOUNTER
PA Started today   Key: [id number]  Qulipta 60MG tablets  Patient does not have active coverage with the payer

## 2024-03-27 NOTE — TELEPHONE ENCOUNTER
Please contact patient.  I received forms from [employer] regarding a plan for short-term disability.  From the standpoint of her endocrine diagnoses, time off work is not needed.    If this form was sent to our office by mistake and there is a another healthcare provider advising her to be off work, I would recommend she reach back out to her short-term disability provider so this documentation may be forwarded to the appropriate provoider.

## 2024-04-01 ENCOUNTER — TELEPHONE (OUTPATIENT)
Dept: FAMILY MEDICINE CLINIC | Facility: CLINIC | Age: 34
End: 2024-04-01
Payer: COMMERCIAL

## 2024-04-01 NOTE — TELEPHONE ENCOUNTER
Called the pt and told her the form needs to be filled out by PCP.     She verbalized understanding.

## 2024-04-01 NOTE — TELEPHONE ENCOUNTER
Caller: Trace Aranda    Relationship: Self    Best call back number: [phone]     What test/procedure requested: PRIOR AUTHORIZATION FOR THE CELEXA     When is it needed: AS SOON AS POSSIBLE

## 2024-04-01 NOTE — TELEPHONE ENCOUNTER
I tried to check on this and it said patient doesn't have active coverage, will try to check with insurance tomorrow by calling them.

## 2024-04-08 NOTE — TELEPHONE ENCOUNTER
Hub and front can read    Tried to reach the pt and let her know that we have tried to submit the Prior auth and jacobo says that she does not have active coverage.

## 2024-04-15 RX ORDER — CABERGOLINE 0.5 MG/1
0.25 TABLET ORAL 2 TIMES WEEKLY
Qty: 8 TABLET | Refills: 1 | Status: SHIPPED | OUTPATIENT
Start: 2024-04-15

## 2024-04-15 NOTE — TELEPHONE ENCOUNTER
Last office visit with prescribing clinician: 2/14/2024       Next office visit with prescribing clinician: 6/27/2024

## 2024-04-29 ENCOUNTER — TELEPHONE (OUTPATIENT)
Dept: NEUROLOGY | Facility: CLINIC | Age: 34
End: 2024-04-29
Payer: COMMERCIAL

## 2024-04-29 NOTE — TELEPHONE ENCOUNTER
----- Message from Phoebe GUZMAN sent at 4/26/2024  4:24 AM EDT -----  Regarding: Appointment Cancellation Request  Contact: [phone]  Trace Aranda would like to cancel the following appointments:    Belkis Mcpherson in [facility] ([id number]), 5/2/2024 10:30 AM    Comments:  I tested pregnant positive I’m 4 weeks pregnant.

## 2024-05-09 ENCOUNTER — PRIOR AUTHORIZATION (OUTPATIENT)
Dept: FAMILY MEDICINE CLINIC | Facility: CLINIC | Age: 34
End: 2024-05-09
Payer: COMMERCIAL